# Patient Record
Sex: FEMALE
[De-identification: names, ages, dates, MRNs, and addresses within clinical notes are randomized per-mention and may not be internally consistent; named-entity substitution may affect disease eponyms.]

---

## 2017-01-03 ENCOUNTER — RESULT REVIEW (OUTPATIENT)
Age: 77
End: 2017-01-03

## 2017-01-04 ENCOUNTER — INPATIENT (INPATIENT)
Facility: HOSPITAL | Age: 77
LOS: 0 days | Discharge: HOME | DRG: 39 | End: 2017-01-05
Attending: SURGERY | Admitting: SURGERY
Payer: MEDICARE

## 2017-01-04 VITALS
HEART RATE: 62 BPM | OXYGEN SATURATION: 98 % | TEMPERATURE: 98 F | WEIGHT: 131.4 LBS | DIASTOLIC BLOOD PRESSURE: 60 MMHG | SYSTOLIC BLOOD PRESSURE: 142 MMHG | RESPIRATION RATE: 16 BRPM | HEIGHT: 65 IN

## 2017-01-04 DIAGNOSIS — Z98.891 HISTORY OF UTERINE SCAR FROM PREVIOUS SURGERY: Chronic | ICD-10-CM

## 2017-01-04 DIAGNOSIS — I65.29 OCCLUSION AND STENOSIS OF UNSPECIFIED CAROTID ARTERY: ICD-10-CM

## 2017-01-04 DIAGNOSIS — Z98.890 OTHER SPECIFIED POSTPROCEDURAL STATES: Chronic | ICD-10-CM

## 2017-01-04 LAB — BLD GP AB SCN SERPL QL: SIGNIFICANT CHANGE UP

## 2017-01-04 PROCEDURE — 88311 DECALCIFY TISSUE: CPT | Mod: 26

## 2017-01-04 PROCEDURE — 88304 TISSUE EXAM BY PATHOLOGIST: CPT | Mod: 26

## 2017-01-04 PROCEDURE — 35301 RECHANNELING OF ARTERY: CPT | Mod: RT

## 2017-01-04 RX ORDER — FENTANYL CITRATE 50 UG/ML
25 INJECTION INTRAVENOUS
Qty: 0 | Refills: 0 | Status: DISCONTINUED | OUTPATIENT
Start: 2017-01-04 | End: 2017-01-04

## 2017-01-04 RX ORDER — ZOLPIDEM TARTRATE 10 MG/1
5 TABLET ORAL AT BEDTIME
Qty: 0 | Refills: 0 | Status: DISCONTINUED | OUTPATIENT
Start: 2017-01-04 | End: 2017-01-05

## 2017-01-04 RX ORDER — SODIUM CHLORIDE 9 MG/ML
500 INJECTION INTRAMUSCULAR; INTRAVENOUS; SUBCUTANEOUS ONCE
Qty: 0 | Refills: 0 | Status: COMPLETED | OUTPATIENT
Start: 2017-01-04 | End: 2017-01-04

## 2017-01-04 RX ORDER — PREGABALIN 225 MG/1
1000 CAPSULE ORAL DAILY
Qty: 0 | Refills: 0 | Status: DISCONTINUED | OUTPATIENT
Start: 2017-01-04 | End: 2017-01-05

## 2017-01-04 RX ORDER — SODIUM CHLORIDE 9 MG/ML
1000 INJECTION, SOLUTION INTRAVENOUS
Qty: 0 | Refills: 0 | Status: DISCONTINUED | OUTPATIENT
Start: 2017-01-04 | End: 2017-01-05

## 2017-01-04 RX ORDER — ASCORBIC ACID 60 MG
500 TABLET,CHEWABLE ORAL DAILY
Qty: 0 | Refills: 0 | Status: DISCONTINUED | OUTPATIENT
Start: 2017-01-04 | End: 2017-01-05

## 2017-01-04 RX ORDER — FAMOTIDINE 10 MG/ML
20 INJECTION INTRAVENOUS
Qty: 0 | Refills: 0 | Status: DISCONTINUED | OUTPATIENT
Start: 2017-01-04 | End: 2017-01-05

## 2017-01-04 RX ORDER — ONDANSETRON 8 MG/1
4 TABLET, FILM COATED ORAL ONCE
Qty: 0 | Refills: 0 | Status: DISCONTINUED | OUTPATIENT
Start: 2017-01-04 | End: 2017-01-04

## 2017-01-04 RX ORDER — LEVOTHYROXINE SODIUM 125 MCG
125 TABLET ORAL DAILY
Qty: 0 | Refills: 0 | Status: DISCONTINUED | OUTPATIENT
Start: 2017-01-04 | End: 2017-01-05

## 2017-01-04 RX ORDER — CEFAZOLIN SODIUM 1 G
2000 VIAL (EA) INJECTION ONCE
Qty: 0 | Refills: 0 | Status: COMPLETED | OUTPATIENT
Start: 2017-01-04 | End: 2017-01-04

## 2017-01-04 RX ORDER — SODIUM CHLORIDE 9 MG/ML
1000 INJECTION, SOLUTION INTRAVENOUS
Qty: 0 | Refills: 0 | Status: DISCONTINUED | OUTPATIENT
Start: 2017-01-04 | End: 2017-01-04

## 2017-01-04 RX ORDER — ASPIRIN/CALCIUM CARB/MAGNESIUM 324 MG
325 TABLET ORAL DAILY
Qty: 0 | Refills: 0 | Status: DISCONTINUED | OUTPATIENT
Start: 2017-01-04 | End: 2017-01-05

## 2017-01-04 RX ORDER — ESCITALOPRAM OXALATE 10 MG/1
20 TABLET, FILM COATED ORAL DAILY
Qty: 0 | Refills: 0 | Status: DISCONTINUED | OUTPATIENT
Start: 2017-01-04 | End: 2017-01-05

## 2017-01-04 RX ORDER — SODIUM CHLORIDE 9 MG/ML
500 INJECTION, SOLUTION INTRAVENOUS ONCE
Qty: 0 | Refills: 0 | Status: DISCONTINUED | OUTPATIENT
Start: 2017-01-04 | End: 2017-01-05

## 2017-01-04 RX ORDER — SODIUM CHLORIDE 9 MG/ML
3 INJECTION INTRAMUSCULAR; INTRAVENOUS; SUBCUTANEOUS EVERY 8 HOURS
Qty: 0 | Refills: 0 | Status: DISCONTINUED | OUTPATIENT
Start: 2017-01-04 | End: 2017-01-04

## 2017-01-04 RX ORDER — HEPARIN SODIUM 5000 [USP'U]/ML
800 INJECTION INTRAVENOUS; SUBCUTANEOUS
Qty: 25000 | Refills: 0 | Status: DISCONTINUED | OUTPATIENT
Start: 2017-01-04 | End: 2017-01-05

## 2017-01-04 RX ORDER — FENTANYL CITRATE 50 UG/ML
50 INJECTION INTRAVENOUS
Qty: 0 | Refills: 0 | Status: DISCONTINUED | OUTPATIENT
Start: 2017-01-04 | End: 2017-01-04

## 2017-01-04 RX ORDER — TRAMADOL HYDROCHLORIDE 50 MG/1
50 TABLET ORAL EVERY 4 HOURS
Qty: 0 | Refills: 0 | Status: DISCONTINUED | OUTPATIENT
Start: 2017-01-04 | End: 2017-01-05

## 2017-01-04 RX ORDER — CHOLECALCIFEROL (VITAMIN D3) 125 MCG
2000 CAPSULE ORAL DAILY
Qty: 0 | Refills: 0 | Status: DISCONTINUED | OUTPATIENT
Start: 2017-01-04 | End: 2017-01-05

## 2017-01-04 RX ADMIN — HEPARIN SODIUM 8 UNIT(S)/HR: 5000 INJECTION INTRAVENOUS; SUBCUTANEOUS at 14:16

## 2017-01-04 RX ADMIN — PREGABALIN 1000 MICROGRAM(S): 225 CAPSULE ORAL at 14:14

## 2017-01-04 RX ADMIN — SODIUM CHLORIDE 125 MILLILITER(S): 9 INJECTION, SOLUTION INTRAVENOUS at 11:51

## 2017-01-04 RX ADMIN — SODIUM CHLORIDE 500 MILLILITER(S): 9 INJECTION INTRAMUSCULAR; INTRAVENOUS; SUBCUTANEOUS at 12:20

## 2017-01-04 RX ADMIN — FENTANYL CITRATE 25 MICROGRAM(S): 50 INJECTION INTRAVENOUS at 15:40

## 2017-01-04 RX ADMIN — FENTANYL CITRATE 50 MICROGRAM(S): 50 INJECTION INTRAVENOUS at 17:39

## 2017-01-04 RX ADMIN — FENTANYL CITRATE 25 MICROGRAM(S): 50 INJECTION INTRAVENOUS at 12:05

## 2017-01-04 RX ADMIN — ESCITALOPRAM OXALATE 20 MILLIGRAM(S): 10 TABLET, FILM COATED ORAL at 14:15

## 2017-01-04 RX ADMIN — Medication 100 MILLIGRAM(S): at 08:40

## 2017-01-04 RX ADMIN — FAMOTIDINE 20 MILLIGRAM(S): 10 INJECTION INTRAVENOUS at 19:10

## 2017-01-04 RX ADMIN — TRAMADOL HYDROCHLORIDE 50 MILLIGRAM(S): 50 TABLET ORAL at 23:21

## 2017-01-04 RX ADMIN — FENTANYL CITRATE 25 MICROGRAM(S): 50 INJECTION INTRAVENOUS at 11:45

## 2017-01-04 RX ADMIN — TRAMADOL HYDROCHLORIDE 50 MILLIGRAM(S): 50 TABLET ORAL at 22:19

## 2017-01-04 RX ADMIN — Medication 325 MILLIGRAM(S): at 14:43

## 2017-01-04 RX ADMIN — FENTANYL CITRATE 25 MICROGRAM(S): 50 INJECTION INTRAVENOUS at 15:36

## 2017-01-04 RX ADMIN — FENTANYL CITRATE 50 MICROGRAM(S): 50 INJECTION INTRAVENOUS at 14:17

## 2017-01-04 RX ADMIN — Medication 500 MILLIGRAM(S): at 14:14

## 2017-01-04 RX ADMIN — Medication 2000 UNIT(S): at 14:15

## 2017-01-04 RX ADMIN — TRAMADOL HYDROCHLORIDE 50 MILLIGRAM(S): 50 TABLET ORAL at 15:37

## 2017-01-04 RX ADMIN — ZOLPIDEM TARTRATE 5 MILLIGRAM(S): 10 TABLET ORAL at 22:19

## 2017-01-04 RX ADMIN — FENTANYL CITRATE 25 MICROGRAM(S): 50 INJECTION INTRAVENOUS at 12:23

## 2017-01-04 RX ADMIN — FENTANYL CITRATE 50 MICROGRAM(S): 50 INJECTION INTRAVENOUS at 14:00

## 2017-01-04 NOTE — PROCEDURE NOTE - NSINDICATIONS_GEN_A_CORE
critical patient/arterial puncture to obtain ABG's/monitoring purposes/cannulation purposes/blood sampling

## 2017-01-04 NOTE — PROCEDURE NOTE - NSPROCDETAILS_GEN_ALL_CORE
sutured in place/hemostasis with direct pressure, dressing applied/all materials/supplies accounted for at end of procedure/location identified, draped/prepped, sterile technique used, needle inserted/introduced/connected to a pressurized flush line/Seldinger technique

## 2017-01-04 NOTE — PROCEDURE NOTE - NSSITEPREP_SKIN_A_CORE
Adherence to aseptic technique: hand hygiene prior to donning barriers (gown, gloves), don cap and mask, sterile drape over patient/chlorhexidine/alcohol

## 2017-01-05 ENCOUNTER — TRANSCRIPTION ENCOUNTER (OUTPATIENT)
Age: 77
End: 2017-01-05

## 2017-01-05 VITALS
SYSTOLIC BLOOD PRESSURE: 132 MMHG | DIASTOLIC BLOOD PRESSURE: 53 MMHG | HEART RATE: 55 BPM | OXYGEN SATURATION: 99 % | TEMPERATURE: 98 F | RESPIRATION RATE: 16 BRPM

## 2017-01-05 LAB
ANION GAP SERPL CALC-SCNC: 8 MMOL/L — SIGNIFICANT CHANGE UP (ref 5–17)
APTT BLD: 63.6 SEC — HIGH (ref 27.5–37.4)
BUN SERPL-MCNC: 16 MG/DL — SIGNIFICANT CHANGE UP (ref 8–20)
CALCIUM SERPL-MCNC: 8.9 MG/DL — SIGNIFICANT CHANGE UP (ref 8.6–10.2)
CHLORIDE SERPL-SCNC: 104 MMOL/L — SIGNIFICANT CHANGE UP (ref 98–107)
CO2 SERPL-SCNC: 28 MMOL/L — SIGNIFICANT CHANGE UP (ref 22–29)
CREAT SERPL-MCNC: 0.71 MG/DL — SIGNIFICANT CHANGE UP (ref 0.5–1.3)
GLUCOSE SERPL-MCNC: 114 MG/DL — SIGNIFICANT CHANGE UP (ref 70–115)
HCT VFR BLD CALC: 26.5 % — LOW (ref 37–47)
HGB BLD-MCNC: 8.2 G/DL — LOW (ref 12–16)
INR BLD: 1.02 RATIO — SIGNIFICANT CHANGE UP (ref 0.88–1.16)
MCHC RBC-ENTMCNC: 26.2 PG — LOW (ref 27–31)
MCHC RBC-ENTMCNC: 30.9 G/DL — LOW (ref 32–36)
MCV RBC AUTO: 84.7 FL — SIGNIFICANT CHANGE UP (ref 81–99)
PLATELET # BLD AUTO: 212 K/UL — SIGNIFICANT CHANGE UP (ref 150–400)
POTASSIUM SERPL-MCNC: 5.3 MMOL/L — SIGNIFICANT CHANGE UP (ref 3.5–5.3)
POTASSIUM SERPL-SCNC: 5.3 MMOL/L — SIGNIFICANT CHANGE UP (ref 3.5–5.3)
PROTHROM AB SERPL-ACNC: 11.2 SEC — SIGNIFICANT CHANGE UP (ref 10–13.1)
RBC # BLD: 3.13 M/UL — LOW (ref 4.4–5.2)
RBC # FLD: 15.9 % — HIGH (ref 11–15.6)
SODIUM SERPL-SCNC: 140 MMOL/L — SIGNIFICANT CHANGE UP (ref 135–145)
WBC # BLD: 7.19 K/UL — SIGNIFICANT CHANGE UP (ref 4.8–10.8)
WBC # FLD AUTO: 7.19 K/UL — SIGNIFICANT CHANGE UP (ref 4.8–10.8)

## 2017-01-05 PROCEDURE — 86850 RBC ANTIBODY SCREEN: CPT

## 2017-01-05 PROCEDURE — C1889: CPT

## 2017-01-05 PROCEDURE — 86900 BLOOD TYPING SEROLOGIC ABO: CPT

## 2017-01-05 PROCEDURE — 86901 BLOOD TYPING SEROLOGIC RH(D): CPT

## 2017-01-05 PROCEDURE — 85730 THROMBOPLASTIN TIME PARTIAL: CPT

## 2017-01-05 PROCEDURE — 80048 BASIC METABOLIC PNL TOTAL CA: CPT

## 2017-01-05 PROCEDURE — 88304 TISSUE EXAM BY PATHOLOGIST: CPT

## 2017-01-05 PROCEDURE — 85027 COMPLETE CBC AUTOMATED: CPT

## 2017-01-05 PROCEDURE — 88311 DECALCIFY TISSUE: CPT

## 2017-01-05 PROCEDURE — 85610 PROTHROMBIN TIME: CPT

## 2017-01-05 PROCEDURE — 86920 COMPATIBILITY TEST SPIN: CPT

## 2017-01-05 RX ORDER — TRAMADOL HYDROCHLORIDE 50 MG/1
1 TABLET ORAL
Qty: 30 | Refills: 0 | OUTPATIENT
Start: 2017-01-05

## 2017-01-05 RX ORDER — ACETAMINOPHEN 500 MG
1000 TABLET ORAL ONCE
Qty: 0 | Refills: 0 | Status: COMPLETED | OUTPATIENT
Start: 2017-01-05 | End: 2017-01-05

## 2017-01-05 RX ORDER — ASPIRIN/CALCIUM CARB/MAGNESIUM 324 MG
1 TABLET ORAL
Qty: 0 | Refills: 0 | COMMUNITY
Start: 2017-01-05

## 2017-01-05 RX ADMIN — TRAMADOL HYDROCHLORIDE 50 MILLIGRAM(S): 50 TABLET ORAL at 07:05

## 2017-01-05 RX ADMIN — ESCITALOPRAM OXALATE 20 MILLIGRAM(S): 10 TABLET, FILM COATED ORAL at 17:05

## 2017-01-05 RX ADMIN — TRAMADOL HYDROCHLORIDE 50 MILLIGRAM(S): 50 TABLET ORAL at 14:30

## 2017-01-05 RX ADMIN — FAMOTIDINE 20 MILLIGRAM(S): 10 INJECTION INTRAVENOUS at 17:06

## 2017-01-05 RX ADMIN — TRAMADOL HYDROCHLORIDE 50 MILLIGRAM(S): 50 TABLET ORAL at 15:30

## 2017-01-05 RX ADMIN — TRAMADOL HYDROCHLORIDE 50 MILLIGRAM(S): 50 TABLET ORAL at 06:03

## 2017-01-05 RX ADMIN — TRAMADOL HYDROCHLORIDE 50 MILLIGRAM(S): 50 TABLET ORAL at 11:30

## 2017-01-05 RX ADMIN — Medication 125 MICROGRAM(S): at 06:03

## 2017-01-05 RX ADMIN — FAMOTIDINE 20 MILLIGRAM(S): 10 INJECTION INTRAVENOUS at 06:03

## 2017-01-05 RX ADMIN — Medication 325 MILLIGRAM(S): at 17:04

## 2017-01-05 RX ADMIN — Medication 400 MILLIGRAM(S): at 08:41

## 2017-01-05 RX ADMIN — FENTANYL CITRATE 50 MICROGRAM(S): 50 INJECTION INTRAVENOUS at 18:30

## 2017-01-05 RX ADMIN — TRAMADOL HYDROCHLORIDE 50 MILLIGRAM(S): 50 TABLET ORAL at 10:41

## 2017-01-05 NOTE — DISCHARGE NOTE ADULT - CARE PROVIDER_API CALL
Shilo Ashby), Surgery; Vascular Surgery  [address] [place] [address]  Phone: [phone]  Fax: [phone]    Alexy Ortega), Cardiovascular Disease; Internal Medicine; Interventional Cardiology  [address]  [place] [address]  Phone: [phone]  Fax: [phone]

## 2017-01-05 NOTE — DISCHARGE NOTE ADULT - CARE PLAN
Principal Discharge DX:	Stenosis of right carotid artery  Goal:	stroke risk reduction  Instructions for follow-up, activity and diet:	May shower and wash incision with soap and water. Pat dry and leave open to air. No ointments, powders or creams on/near incision. Tape strips will start to curl and may fall off in next 1-2 weeks. Moniotr for any swelling , redness, or drainage from wound. Call office for any changes. Follow up with Dr Donita Uribe in 1 week. Office will call for appointment.

## 2017-01-05 NOTE — DISCHARGE NOTE ADULT - MEDICATION SUMMARY - MEDICATIONS TO STOP TAKING
I will STOP taking the medications listed below when I get home from the hospital:    metoprolol succinate 25 mg oral tablet, extended release  -- 1 tab(s) by mouth once a day

## 2017-01-05 NOTE — DISCHARGE NOTE ADULT - PLAN OF CARE
stroke risk reduction May shower and wash incision with soap and water. Pat dry and leave open to air. No ointments, powders or creams on/near incision. Tape strips will start to curl and may fall off in next 1-2 weeks. Moniotr for any swelling , redness, or drainage from wound. Call office for any changes. Follow up with Dr Donita Uribe in 1 week. Office will call for appointment.

## 2017-01-05 NOTE — DISCHARGE NOTE ADULT - NS AS ACTIVITY OBS
Walking-Indoors allowed/Bathing allowed/Walking-Outdoors allowed/Stairs allowed/Do not drive or operate machinery/Showering allowed/No Heavy lifting/straining

## 2017-01-05 NOTE — DISCHARGE NOTE ADULT - PATIENT PORTAL LINK FT
“You can access the Follow[facility] Patient Portal, offered by [facility], by registering with the following website: [email or web address]”

## 2017-01-05 NOTE — DISCHARGE NOTE ADULT - NS MD DC FALL RISK RISK
For information on Fall & Injury Prevention, visit [email or web address]

## 2017-01-05 NOTE — DISCHARGE NOTE ADULT - MEDICATION SUMMARY - MEDICATIONS TO TAKE
I will START or STAY ON the medications listed below when I get home from the hospital:    aspirin 325 mg oral tablet  -- 1 tab(s) by mouth once a day  -- Indication: For Carotid stenosis    traMADol 50 mg oral tablet  -- 1 tab(s) by mouth every 4 hours, As Needed -for moderate pain MDD:6 tabs  -- Indication: For Pain    celecoxib 200 mg oral capsule  -- 1 cap(s) by mouth once a day  -- Indication: For Pain    gabapentin 300 mg oral capsule  -- 1 cap(s) by mouth 3 times a day  -- Indication: For Pain    Lexapro 20 mg oral tablet  -- 1 tab(s) by mouth once a day  -- Indication: For Depression    Crestor 5 mg oral tablet  -- 1 tab(s) by mouth once a day (at bedtime)  -- Indication: For HLD    ALPRAZolam 0.5 mg oral tablet  -- 1 tab(s) by mouth 4 times a day, As Needed  -- Indication: For Anxiety and depression    zolpidem 5 mg oral tablet  -- 1 tab(s) by mouth once a day (at bedtime), As Needed  -- Indication: For Insomnia    fluticasone-vilanterol 100 mcg-25 mcg/inh inhalation powder  -- 1 puff(s) inhaled once a day  -- Indication: For COPD    famotidine 20 mg oral tablet  -- 1 tab(s) by mouth 2 times a day  -- Indication: For GI bleed    omeprazole 20 mg oral delayed release tablet  -- 1 tab(s) by mouth once a day  -- Indication: For GI bleed    Synthroid 125 mcg (0.125 mg) oral tablet  -- 1 tab(s) by mouth every other day  -- Indication: For Hypothyroid    Vitamin C 500 mg oral tablet  -- 1 tab(s) by mouth once a day  -- Indication: For Supplement    Vitamin D3 2000 intl units oral capsule  -- 1 cap(s) by mouth once a day  -- Indication: For Supplement    cyanocobalamin 1000 mcg oral tablet  -- 1 tab(s) by mouth once a day  -- Indication: For Supplement

## 2017-01-05 NOTE — DISCHARGE NOTE ADULT - CARE PROVIDERS DIRECT ADDRESSES
[email or web address]

## 2017-01-05 NOTE — PROGRESS NOTE ADULT - SUBJECTIVE AND OBJECTIVE BOX
Procedure: s/p right carotid artery endarterectomy    IV: NS bolus @500cc/hr X 1       LR @ 100cc/hr       heparin drip 8cc/hr  Patient: afebrile awake and alert resting comfortable in stretcher.              denies headache, dizziness or light headedness. denies nausea,vomiting              tolerating PO fluids.  Vital signs:  pulse: 55    resp. 20   O2 sat.- 99 %  BP :   94/56  face: good smile, no lip droop, god tongue range of motion, good voice tone.  Neck: supple, no swelling good range of motion, no bruit auscltated. surgical site             dressing dry and intact. no difficulty swallowing.  DEBRA drain in place scant drainage at present 3cc reddish serosangenous.    Chest: good air exchange, denies SOB, denies pain, or palpatations. clear BS  Abdomen: soft, benign     Extremities: moves Follow all verbal and written instructions. Take medications as prescribed. DO NOT drive, operate machinery, and/or make important decisions while on prescription pain medication. DO NOT hesitate to call Doctor's office with questions or concerns. extremities well, good  bilateral hands, moves fingers and arms well.    bilateral lower extremities warm to palpation , moves toes bilaterally.  Right groin dressing dry and intact removal of arterial line just recently.    neuro check:  grossly intact..     Output: yu catheter in place - 200 cc yellows.    Labs ordered for am.      Impression: stable post op s/p right carotid artery endarterectomy.  Tolerated surgery well, surgery site clean and dry, no swelling or bleeding evident     Plan: continue present care and management and follow, increase diet Dash/tlc as tolerated, repeat labs in am, d/c yu catheter in am.OOB in am. follow DEBRA output.
Called to see patient complaining of right wrist discomfort and "numbness". Patient was immediately seen and examined by me. Patient is s/p Right Carotid endarterectomy, uncomplicated intraoperative course, attempted left radial arterial line during operation but unsuccessful after several attempts in the OR. The hand and wrist were examined. Full motor strength and sensation intact in the hand (tested with alcohol swab, pinprick and hand ). Patient is able to move her hand and wrist through all ranges of motion and radial and ulnar artery pulses are intact. Patient smile and grimace is normal. There is a non-tense hematoma present over the radial artery in the area of "numbness" described by the patient but she responds to gross touch and temperature sensation in that area. This is likely discomfort from the hematoma that is present. I will apply warm compress and have informed Dr. Duckworth.
Patient is a 76y old  Female who presents with a chief complaint of I have a 90%  blockage in my right carotid. (27 Dec 2016 13:35)    Pt is S/P   R CEA       POD#   1  Pt c/o R posterior neck pain radiating to base of skull with headache since this am. Treated with tramadol, IV tylenol and heat pack with improvement. Pt OOB and ambulated to bathroom with min assistance    Vital Signs Last 24 Hrs  T(C): 36.5, Max: 36.8 (- @ 22:00)  T(F): 97.7, Max: 98.3 (- @ 22:00)  HR: 57 (46 - 79)  BP: 116/58 (82/44 - 124/50)  BP(mean): --  RR: 15 (14 - 18)  SpO2: 94% (94% - 100%)  I&O's Detail      PAST MEDICAL & SURGICAL HISTORY:  GI bleed  Anxiety and depression  PVD (peripheral vascular disease): stents LLE  Dyslipidemia  Hypothyroid  Hypertension  S/P ovarian cystectomy: with appendectomy  S/P  section    Physical Exam:  General: Awake, alert. Speech fluent. Face symmetrical. R neck incision with steris intact C&D. DEBRA with sm amt serosang fluid draining-->dc'd  Pulmonary: Nonlabored breathing,CTA  Cardiovascular: Normal S1, S2  Abdominal: soft, NT/ND  Extremities: WWP. L radial with ecchymosis and sm hematoma. Radial pulse 2+    LABS:                        8.2    7.19  )-----------( 212      ( 2017 05:14 )             26.5     2017 05:14    140    |  104    |  16.0   ----------------------------<  114    5.3     |  28.0   |  0.71     Ca    8.9        2017 05:14      PT/INR - ( 2017 05:14 )   PT: 11.2 sec;   INR: 1.02 ratio         PTT - ( 2017 05:14 )  PTT:63.6 sec  CAPILLARY BLOOD GLUCOSE      Radiology and Additional Studies:    Assessment:76yFemaleHPI:  77 yo female with carotid artery stenosis, planning endarterectomy. (27 Dec 2016 13:09)  Pt is S/P   R CEA       POD#   1    Plan:  Cont ASA and Statin  Cont to hold BB with asymptomatic bradycardia  OOB/Ambulate/PT  DC yu-pt already voided  Possible dc home later today  Seen and discussed with Dr. Donita Uribe
Pt seen, chart reviewed.  S/p Right Carotid Endarterectomy, POD#1.  VSS.  Adequate pain control.  Left Arm Edematous and Ecchymotic.  + pulses noted.  Numbness and Tingling resolved.  Resting comfortably.   Tolerating PO intake.  No N/V.    No anesthesia problems noted.

## 2017-01-05 NOTE — DISCHARGE NOTE ADULT - HOSPITAL COURSE
75 y/o F with h/o carotid stenosis followed by Dr Donita Uribe. Admitted on 1/4/17 for elective surgery. Taken to OR on 1/4/17 by Dr Donita Uribe and underwent R CEA. Post op pt with very brief episodes of hypotension requiring perico bolus and fluid bolus. No further hypotension noted after waking and was able to be monitored on 3T. POD #1, pt had c/o neck/headache c/w spasm and relieved with Tramadol and hot packs. Her DEBRA was dc'd and she was OOB ambulating. The pt has had asymptomatic bradycardia and therefore her BB was not resumed. She is stable for dc home with outpt follow up with Dr Donita Uribe.

## 2017-01-05 NOTE — DISCHARGE NOTE ADULT - MEDICATION SUMMARY - MEDICATIONS TO CHANGE
I will SWITCH the dose or number of times a day I take the medications listed below when I get home from the hospital:    Aspirin Enteric Coated 81 mg oral delayed release tablet  -- 1 tab(s) by mouth once a day

## 2017-01-06 LAB — SURGICAL PATHOLOGY FINAL REPORT - CH: SIGNIFICANT CHANGE UP

## 2017-01-13 ENCOUNTER — APPOINTMENT (OUTPATIENT)
Dept: VASCULAR SURGERY | Facility: CLINIC | Age: 77
End: 2017-01-13

## 2017-01-13 VITALS
RESPIRATION RATE: 16 BRPM | HEIGHT: 65 IN | TEMPERATURE: 98.3 F | BODY MASS INDEX: 21.66 KG/M2 | DIASTOLIC BLOOD PRESSURE: 82 MMHG | SYSTOLIC BLOOD PRESSURE: 145 MMHG | HEART RATE: 74 BPM | WEIGHT: 130 LBS | OXYGEN SATURATION: 96 %

## 2017-01-13 RX ORDER — ESCITALOPRAM OXALATE 20 MG/1
20 TABLET ORAL
Qty: 90 | Refills: 0 | Status: ACTIVE | COMMUNITY
Start: 2016-11-02

## 2017-01-13 RX ORDER — ONDANSETRON 4 MG/1
4 TABLET ORAL
Qty: 15 | Refills: 0 | Status: ACTIVE | COMMUNITY
Start: 2016-11-16

## 2017-01-13 RX ORDER — OMEPRAZOLE 20 MG/1
20 CAPSULE, DELAYED RELEASE ORAL
Qty: 30 | Refills: 0 | Status: ACTIVE | COMMUNITY
Start: 2016-12-19

## 2017-03-17 ENCOUNTER — TRANSCRIPTION ENCOUNTER (OUTPATIENT)
Age: 77
End: 2017-03-17

## 2017-03-31 ENCOUNTER — APPOINTMENT (OUTPATIENT)
Dept: VASCULAR SURGERY | Facility: CLINIC | Age: 77
End: 2017-03-31

## 2017-03-31 VITALS
DIASTOLIC BLOOD PRESSURE: 82 MMHG | TEMPERATURE: 98.4 F | HEIGHT: 65 IN | HEART RATE: 60 BPM | SYSTOLIC BLOOD PRESSURE: 150 MMHG | RESPIRATION RATE: 16 BRPM | WEIGHT: 139 LBS | OXYGEN SATURATION: 96 % | BODY MASS INDEX: 23.16 KG/M2

## 2017-03-31 VITALS — SYSTOLIC BLOOD PRESSURE: 167 MMHG | DIASTOLIC BLOOD PRESSURE: 72 MMHG

## 2017-06-27 ENCOUNTER — OUTPATIENT (OUTPATIENT)
Dept: OUTPATIENT SERVICES | Facility: HOSPITAL | Age: 77
LOS: 1 days | Discharge: HOME | End: 2017-06-27
Payer: MEDICARE

## 2017-06-27 VITALS
RESPIRATION RATE: 14 BRPM | HEIGHT: 64 IN | TEMPERATURE: 98 F | HEART RATE: 60 BPM | SYSTOLIC BLOOD PRESSURE: 156 MMHG | WEIGHT: 138.01 LBS | DIASTOLIC BLOOD PRESSURE: 57 MMHG | OXYGEN SATURATION: 100 %

## 2017-06-27 DIAGNOSIS — Z98.890 OTHER SPECIFIED POSTPROCEDURAL STATES: Chronic | ICD-10-CM

## 2017-06-27 DIAGNOSIS — Z98.891 HISTORY OF UTERINE SCAR FROM PREVIOUS SURGERY: Chronic | ICD-10-CM

## 2017-06-27 DIAGNOSIS — M16.11 UNILATERAL PRIMARY OSTEOARTHRITIS, RIGHT HIP: ICD-10-CM

## 2017-06-27 LAB
ANION GAP SERPL CALC-SCNC: 6 MMOL/L — SIGNIFICANT CHANGE UP (ref 5–17)
ANISOCYTOSIS BLD QL: SLIGHT — SIGNIFICANT CHANGE UP
APPEARANCE UR: CLEAR — SIGNIFICANT CHANGE UP
BACTERIA # UR AUTO: (no result)
BASOPHILS # BLD AUTO: 0.1 K/UL — SIGNIFICANT CHANGE UP (ref 0–0.2)
BASOPHILS NFR BLD AUTO: 0.7 % — SIGNIFICANT CHANGE UP (ref 0–2)
BILIRUB UR-MCNC: NEGATIVE — SIGNIFICANT CHANGE UP
BLD GP AB SCN SERPL QL: SIGNIFICANT CHANGE UP
BUN SERPL-MCNC: 16 MG/DL — SIGNIFICANT CHANGE UP (ref 7–23)
CALCIUM SERPL-MCNC: 9.6 MG/DL — SIGNIFICANT CHANGE UP (ref 8.5–10.1)
CHLORIDE SERPL-SCNC: 105 MMOL/L — SIGNIFICANT CHANGE UP (ref 96–108)
CO2 SERPL-SCNC: 30 MMOL/L — SIGNIFICANT CHANGE UP (ref 22–31)
COLOR SPEC: YELLOW — SIGNIFICANT CHANGE UP
COMMENT - URINE: SIGNIFICANT CHANGE UP
CREAT SERPL-MCNC: 0.62 MG/DL — SIGNIFICANT CHANGE UP (ref 0.5–1.3)
DACRYOCYTES BLD QL SMEAR: SLIGHT — SIGNIFICANT CHANGE UP
DIFF PNL FLD: (no result)
ELLIPTOCYTES BLD QL SMEAR: SLIGHT — SIGNIFICANT CHANGE UP
EOSINOPHIL # BLD AUTO: 0.2 K/UL — SIGNIFICANT CHANGE UP (ref 0–0.5)
EOSINOPHIL NFR BLD AUTO: 1.5 % — SIGNIFICANT CHANGE UP (ref 0–6)
EPI CELLS # UR: SIGNIFICANT CHANGE UP
GLUCOSE SERPL-MCNC: 89 MG/DL — SIGNIFICANT CHANGE UP (ref 70–99)
GLUCOSE UR QL: NEGATIVE MG/DL — SIGNIFICANT CHANGE UP
HCT VFR BLD CALC: 42.7 % — SIGNIFICANT CHANGE UP (ref 34.5–45)
HGB BLD-MCNC: 13.8 G/DL — SIGNIFICANT CHANGE UP (ref 11.5–15.5)
HYALINE CASTS # UR AUTO: (no result) /LPF
KETONES UR-MCNC: NEGATIVE — SIGNIFICANT CHANGE UP
LEUKOCYTE ESTERASE UR-ACNC: NEGATIVE — SIGNIFICANT CHANGE UP
LYMPHOCYTES # BLD AUTO: 1 K/UL — SIGNIFICANT CHANGE UP (ref 1–3.3)
LYMPHOCYTES # BLD AUTO: 7.5 % — LOW (ref 13–44)
MACROCYTES BLD QL: SLIGHT — SIGNIFICANT CHANGE UP
MANUAL DIF COMMENT BLD-IMP: SIGNIFICANT CHANGE UP
MCHC RBC-ENTMCNC: 30.1 PG — SIGNIFICANT CHANGE UP (ref 27–34)
MCHC RBC-ENTMCNC: 32.3 GM/DL — SIGNIFICANT CHANGE UP (ref 32–36)
MCV RBC AUTO: 93.1 FL — SIGNIFICANT CHANGE UP (ref 80–100)
MICROCYTES BLD QL: SLIGHT — SIGNIFICANT CHANGE UP
MONOCYTES # BLD AUTO: 0.8 K/UL — SIGNIFICANT CHANGE UP (ref 0–0.9)
MONOCYTES NFR BLD AUTO: 6.3 % — SIGNIFICANT CHANGE UP (ref 2–14)
MRSA PCR RESULT.: SIGNIFICANT CHANGE UP
NEUTROPHILS # BLD AUTO: 11.1 K/UL — HIGH (ref 1.8–7.4)
NEUTROPHILS NFR BLD AUTO: 84 % — HIGH (ref 43–77)
NITRITE UR-MCNC: NEGATIVE — SIGNIFICANT CHANGE UP
PH UR: 6 — SIGNIFICANT CHANGE UP (ref 5–8)
PLAT MORPH BLD: NORMAL — SIGNIFICANT CHANGE UP
PLATELET # BLD AUTO: 267 K/UL — SIGNIFICANT CHANGE UP (ref 150–400)
POIKILOCYTOSIS BLD QL AUTO: SLIGHT — SIGNIFICANT CHANGE UP
POTASSIUM SERPL-MCNC: 4.4 MMOL/L — SIGNIFICANT CHANGE UP (ref 3.5–5.3)
POTASSIUM SERPL-SCNC: 4.4 MMOL/L — SIGNIFICANT CHANGE UP (ref 3.5–5.3)
PROT UR-MCNC: NEGATIVE MG/DL — SIGNIFICANT CHANGE UP
RBC # BLD: 4.58 M/UL — SIGNIFICANT CHANGE UP (ref 3.8–5.2)
RBC # FLD: 20.8 % — HIGH (ref 10.3–14.5)
RBC BLD AUTO: (no result)
RBC CASTS # UR COMP ASSIST: SIGNIFICANT CHANGE UP /HPF (ref 0–4)
S AUREUS DNA NOSE QL NAA+PROBE: SIGNIFICANT CHANGE UP
SODIUM SERPL-SCNC: 141 MMOL/L — SIGNIFICANT CHANGE UP (ref 135–145)
SP GR SPEC: 1.01 — SIGNIFICANT CHANGE UP (ref 1.01–1.02)
TYPE + AB SCN PNL BLD: SIGNIFICANT CHANGE UP
UROBILINOGEN FLD QL: NEGATIVE MG/DL — SIGNIFICANT CHANGE UP
WBC # BLD: 13.2 K/UL — HIGH (ref 3.8–10.5)
WBC # FLD AUTO: 13.2 K/UL — HIGH (ref 3.8–10.5)
WBC UR QL: SIGNIFICANT CHANGE UP

## 2017-06-27 PROCEDURE — 93010 ELECTROCARDIOGRAM REPORT: CPT

## 2017-06-27 PROCEDURE — 71020: CPT | Mod: 26

## 2017-06-27 NOTE — H&P PST ADULT - MUSCULOSKELETAL
details… detailed exam right lower extremity 4/5/diminished strength decreased ROM due to pain/right lower extremity 4/5; decreased ROM right shoulder/diminished strength

## 2017-06-27 NOTE — H&P PST ADULT - PSH
S/P carotid endarterectomy  right; 2017  S/P  section  X2 ,   S/P ovarian cystectomy  with appendectomy; 1966

## 2017-06-27 NOTE — H&P PST ADULT - FAMILY HISTORY
Father  Still living? No  Family history of lung cancer, Age at diagnosis: 61-70     Mother  Still living? No  Maternal family history of emphysema, Age at diagnosis: 51-60     Sibling  Still living? No  Family history of CVA, Age at diagnosis: 41-50

## 2017-06-27 NOTE — H&P PST ADULT - HISTORY OF PRESENT ILLNESS
This is a  76y /o  Female who presents to [facility] prior to proposed procedure with Dr. Collado for right total hip replacement.  Reports followed by  spine for limbar disc disorder and follow up with Pain Management and ordered a right hip x-ray which demonstrated osteoarthritis of right hip. Reports uses cane and walker and Vicodin as directed to help manage pain. Evaluated by Dr. Collado and now presents for said procedure.

## 2017-06-27 NOTE — H&P PST ADULT - PMH
Anemia    Antalgic gait    Anxiety and depression    CAD (coronary artery disease)    COPD (chronic obstructive pulmonary disease)    Dyslipidemia    Gallstones    History of GI bleed  10/4/2016  Hypertension    Hypothyroid    Lumbar disc disorder    Neuropathy    Osteoarthritis of hip  right  Osteoporosis    PVD (peripheral vascular disease)  stents LLE  Sciatic pain, right Anemia    Antalgic gait    Anxiety and depression    CAD (coronary artery disease)    COPD (chronic obstructive pulmonary disease)    Dyslipidemia    Frozen shoulder syndrome  right  Gallstones    History of GI bleed  10/4/2016  Hypertension    Hypothyroid    Lumbar disc disorder    Neuropathy    Osteoarthritis of hip  right  Osteoporosis    PVD (peripheral vascular disease)  stents LLE  Sciatic pain, right

## 2017-06-27 NOTE — H&P PST ADULT - ASSESSMENT
This is a  76y /o  Female who presents to [facility] prior to proposed procedure with Dr. Collado for right total hip replacement.     1. labs: per Dr. Collado.  2. CXR to be reviewed by Radiology.  3. EKG to be reviewed by Cardiology.  4. Clearance with Dr. Campos as scheduled.  5. PT/INR STAT day of procedure.  6. EZ sponges, holistic sheet, pamphlet, and day of procedure instructions provided and reviewed with patient.  7. Smoking cessation pamphlet provided and reviewed with patient.

## 2017-06-29 RX ORDER — SODIUM CHLORIDE 9 MG/ML
3 INJECTION INTRAMUSCULAR; INTRAVENOUS; SUBCUTANEOUS EVERY 8 HOURS
Qty: 0 | Refills: 0 | Status: DISCONTINUED | OUTPATIENT
Start: 2017-06-30 | End: 2017-07-05

## 2017-06-29 RX ORDER — ACETAMINOPHEN 500 MG
650 TABLET ORAL ONCE
Qty: 0 | Refills: 0 | Status: COMPLETED | OUTPATIENT
Start: 2017-06-30 | End: 2017-06-30

## 2017-06-29 RX ORDER — FAMOTIDINE 10 MG/ML
20 INJECTION INTRAVENOUS ONCE
Qty: 0 | Refills: 0 | Status: COMPLETED | OUTPATIENT
Start: 2017-06-30 | End: 2017-06-30

## 2017-06-30 ENCOUNTER — INPATIENT (INPATIENT)
Facility: HOSPITAL | Age: 77
LOS: 4 days | Discharge: NURSING, REHAB OR LONG-TERM CARE | End: 2017-07-05
Attending: ORTHOPAEDIC SURGERY | Admitting: ORTHOPAEDIC SURGERY
Payer: MEDICARE

## 2017-06-30 ENCOUNTER — RESULT REVIEW (OUTPATIENT)
Age: 77
End: 2017-06-30

## 2017-06-30 VITALS
SYSTOLIC BLOOD PRESSURE: 116 MMHG | RESPIRATION RATE: 16 BRPM | WEIGHT: 138.01 LBS | DIASTOLIC BLOOD PRESSURE: 61 MMHG | TEMPERATURE: 99 F | HEART RATE: 65 BPM | HEIGHT: 64 IN

## 2017-06-30 DIAGNOSIS — Z98.891 HISTORY OF UTERINE SCAR FROM PREVIOUS SURGERY: Chronic | ICD-10-CM

## 2017-06-30 DIAGNOSIS — Z98.890 OTHER SPECIFIED POSTPROCEDURAL STATES: Chronic | ICD-10-CM

## 2017-06-30 LAB
ANION GAP SERPL CALC-SCNC: 6 MMOL/L — SIGNIFICANT CHANGE UP (ref 5–17)
BUN SERPL-MCNC: 17 MG/DL — SIGNIFICANT CHANGE UP (ref 7–23)
CALCIUM SERPL-MCNC: 8.1 MG/DL — LOW (ref 8.5–10.1)
CHLORIDE SERPL-SCNC: 111 MMOL/L — HIGH (ref 96–108)
CO2 SERPL-SCNC: 27 MMOL/L — SIGNIFICANT CHANGE UP (ref 22–31)
CREAT SERPL-MCNC: 0.54 MG/DL — SIGNIFICANT CHANGE UP (ref 0.5–1.3)
GLUCOSE SERPL-MCNC: 91 MG/DL — SIGNIFICANT CHANGE UP (ref 70–99)
HCT VFR BLD CALC: 34.4 % — LOW (ref 34.5–45)
HGB BLD-MCNC: 11.7 G/DL — SIGNIFICANT CHANGE UP (ref 11.5–15.5)
INR BLD: 1.05 RATIO — SIGNIFICANT CHANGE UP (ref 0.88–1.16)
MCHC RBC-ENTMCNC: 31.6 PG — SIGNIFICANT CHANGE UP (ref 27–34)
MCHC RBC-ENTMCNC: 34 GM/DL — SIGNIFICANT CHANGE UP (ref 32–36)
MCV RBC AUTO: 92.8 FL — SIGNIFICANT CHANGE UP (ref 80–100)
PLATELET # BLD AUTO: 200 K/UL — SIGNIFICANT CHANGE UP (ref 150–400)
POTASSIUM SERPL-MCNC: 4.5 MMOL/L — SIGNIFICANT CHANGE UP (ref 3.5–5.3)
POTASSIUM SERPL-SCNC: 4.5 MMOL/L — SIGNIFICANT CHANGE UP (ref 3.5–5.3)
PROTHROM AB SERPL-ACNC: 11.3 SEC — SIGNIFICANT CHANGE UP (ref 9.8–12.7)
RBC # BLD: 3.7 M/UL — LOW (ref 3.8–5.2)
RBC # FLD: 19.5 % — HIGH (ref 10.3–14.5)
SODIUM SERPL-SCNC: 144 MMOL/L — SIGNIFICANT CHANGE UP (ref 135–145)
WBC # BLD: 8.5 K/UL — SIGNIFICANT CHANGE UP (ref 3.8–10.5)
WBC # FLD AUTO: 8.5 K/UL — SIGNIFICANT CHANGE UP (ref 3.8–10.5)

## 2017-06-30 PROCEDURE — 73501 X-RAY EXAM HIP UNI 1 VIEW: CPT | Mod: 26

## 2017-06-30 PROCEDURE — 74000: CPT | Mod: 26

## 2017-06-30 PROCEDURE — 88305 TISSUE EXAM BY PATHOLOGIST: CPT | Mod: 26

## 2017-06-30 RX ORDER — FLUTICASONE PROPIONATE 220 MCG
1 AEROSOL WITH ADAPTER (GRAM) INHALATION DAILY
Qty: 0 | Refills: 0 | Status: DISCONTINUED | OUTPATIENT
Start: 2017-06-30 | End: 2017-07-05

## 2017-06-30 RX ORDER — HYDROMORPHONE HYDROCHLORIDE 2 MG/ML
0.5 INJECTION INTRAMUSCULAR; INTRAVENOUS; SUBCUTANEOUS EVERY 4 HOURS
Qty: 0 | Refills: 0 | Status: DISCONTINUED | OUTPATIENT
Start: 2017-06-30 | End: 2017-06-30

## 2017-06-30 RX ORDER — ALPRAZOLAM 0.25 MG
0.5 TABLET ORAL
Qty: 0 | Refills: 0 | Status: DISCONTINUED | OUTPATIENT
Start: 2017-06-30 | End: 2017-07-05

## 2017-06-30 RX ORDER — ALBUTEROL 90 UG/1
1 AEROSOL, METERED ORAL EVERY 6 HOURS
Qty: 0 | Refills: 0 | Status: DISCONTINUED | OUTPATIENT
Start: 2017-06-30 | End: 2017-07-03

## 2017-06-30 RX ORDER — SODIUM CHLORIDE 9 MG/ML
1000 INJECTION INTRAMUSCULAR; INTRAVENOUS; SUBCUTANEOUS
Qty: 0 | Refills: 0 | Status: DISCONTINUED | OUTPATIENT
Start: 2017-06-30 | End: 2017-06-30

## 2017-06-30 RX ORDER — LEVOTHYROXINE SODIUM 125 MCG
1 TABLET ORAL
Qty: 0 | Refills: 0 | COMMUNITY

## 2017-06-30 RX ORDER — OXYCODONE HYDROCHLORIDE 5 MG/1
10 TABLET ORAL EVERY 4 HOURS
Qty: 0 | Refills: 0 | Status: DISCONTINUED | OUTPATIENT
Start: 2017-06-30 | End: 2017-07-05

## 2017-06-30 RX ORDER — ONDANSETRON 8 MG/1
4 TABLET, FILM COATED ORAL ONCE
Qty: 0 | Refills: 0 | Status: DISCONTINUED | OUTPATIENT
Start: 2017-06-30 | End: 2017-06-30

## 2017-06-30 RX ORDER — ASCORBIC ACID 60 MG
500 TABLET,CHEWABLE ORAL
Qty: 0 | Refills: 0 | Status: DISCONTINUED | OUTPATIENT
Start: 2017-06-30 | End: 2017-07-05

## 2017-06-30 RX ORDER — METOPROLOL TARTRATE 50 MG
25 TABLET ORAL DAILY
Qty: 0 | Refills: 0 | Status: DISCONTINUED | OUTPATIENT
Start: 2017-06-30 | End: 2017-07-05

## 2017-06-30 RX ORDER — SENNA PLUS 8.6 MG/1
2 TABLET ORAL AT BEDTIME
Qty: 0 | Refills: 0 | Status: DISCONTINUED | OUTPATIENT
Start: 2017-06-30 | End: 2017-07-05

## 2017-06-30 RX ORDER — ACETAMINOPHEN 500 MG
650 TABLET ORAL EVERY 6 HOURS
Qty: 0 | Refills: 0 | Status: DISCONTINUED | OUTPATIENT
Start: 2017-06-30 | End: 2017-07-05

## 2017-06-30 RX ORDER — ATORVASTATIN CALCIUM 80 MG/1
20 TABLET, FILM COATED ORAL AT BEDTIME
Qty: 0 | Refills: 0 | Status: DISCONTINUED | OUTPATIENT
Start: 2017-06-30 | End: 2017-07-05

## 2017-06-30 RX ORDER — LEVOTHYROXINE SODIUM 125 MCG
112 TABLET ORAL EVERY OTHER DAY
Qty: 0 | Refills: 0 | Status: DISCONTINUED | OUTPATIENT
Start: 2017-06-30 | End: 2017-07-05

## 2017-06-30 RX ORDER — ONDANSETRON 8 MG/1
1 TABLET, FILM COATED ORAL
Qty: 0 | Refills: 0 | COMMUNITY

## 2017-06-30 RX ORDER — ESCITALOPRAM OXALATE 10 MG/1
20 TABLET, FILM COATED ORAL DAILY
Qty: 0 | Refills: 0 | Status: DISCONTINUED | OUTPATIENT
Start: 2017-06-30 | End: 2017-07-05

## 2017-06-30 RX ORDER — LEVOTHYROXINE SODIUM 125 MCG
125 TABLET ORAL EVERY OTHER DAY
Qty: 0 | Refills: 0 | Status: DISCONTINUED | OUTPATIENT
Start: 2017-06-30 | End: 2017-06-30

## 2017-06-30 RX ORDER — FERROUS SULFATE 325(65) MG
325 TABLET ORAL
Qty: 0 | Refills: 0 | Status: DISCONTINUED | OUTPATIENT
Start: 2017-06-30 | End: 2017-07-05

## 2017-06-30 RX ORDER — CEFAZOLIN SODIUM 1 G
2000 VIAL (EA) INJECTION EVERY 8 HOURS
Qty: 0 | Refills: 0 | Status: COMPLETED | OUTPATIENT
Start: 2017-07-01 | End: 2017-07-01

## 2017-06-30 RX ORDER — HYDROMORPHONE HYDROCHLORIDE 2 MG/ML
1 INJECTION INTRAMUSCULAR; INTRAVENOUS; SUBCUTANEOUS EVERY 4 HOURS
Qty: 0 | Refills: 0 | Status: DISCONTINUED | OUTPATIENT
Start: 2017-06-30 | End: 2017-07-05

## 2017-06-30 RX ORDER — ASPIRIN/CALCIUM CARB/MAGNESIUM 324 MG
1 TABLET ORAL
Qty: 0 | Refills: 0 | COMMUNITY

## 2017-06-30 RX ORDER — GABAPENTIN 400 MG/1
100 CAPSULE ORAL EVERY 8 HOURS
Qty: 0 | Refills: 0 | Status: DISCONTINUED | OUTPATIENT
Start: 2017-06-30 | End: 2017-07-05

## 2017-06-30 RX ORDER — DOCUSATE SODIUM 100 MG
100 CAPSULE ORAL EVERY 12 HOURS
Qty: 0 | Refills: 0 | Status: DISCONTINUED | OUTPATIENT
Start: 2017-06-30 | End: 2017-07-05

## 2017-06-30 RX ORDER — DIPHENHYDRAMINE HCL 50 MG
25 CAPSULE ORAL AT BEDTIME
Qty: 0 | Refills: 0 | Status: DISCONTINUED | OUTPATIENT
Start: 2017-06-30 | End: 2017-06-30

## 2017-06-30 RX ORDER — FENTANYL CITRATE 50 UG/ML
25 INJECTION INTRAVENOUS
Qty: 0 | Refills: 0 | Status: DISCONTINUED | OUTPATIENT
Start: 2017-06-30 | End: 2017-06-30

## 2017-06-30 RX ORDER — BENZOCAINE AND MENTHOL 5; 1 G/100ML; G/100ML
1 LIQUID ORAL EVERY 4 HOURS
Qty: 0 | Refills: 0 | Status: DISCONTINUED | OUTPATIENT
Start: 2017-06-30 | End: 2017-07-05

## 2017-06-30 RX ORDER — ROPIVACAINE HCL/PF 5 MG/ML
200 AMPUL (ML) INJECTION
Qty: 0 | Refills: 0 | Status: DISCONTINUED | OUTPATIENT
Start: 2017-06-30 | End: 2017-07-05

## 2017-06-30 RX ORDER — LEVOTHYROXINE SODIUM 125 MCG
125 TABLET ORAL EVERY OTHER DAY
Qty: 0 | Refills: 0 | Status: DISCONTINUED | OUTPATIENT
Start: 2017-06-30 | End: 2017-07-05

## 2017-06-30 RX ORDER — OXYCODONE HYDROCHLORIDE 5 MG/1
5 TABLET ORAL EVERY 4 HOURS
Qty: 0 | Refills: 0 | Status: DISCONTINUED | OUTPATIENT
Start: 2017-06-30 | End: 2017-07-05

## 2017-06-30 RX ORDER — ASPIRIN/CALCIUM CARB/MAGNESIUM 324 MG
81 TABLET ORAL DAILY
Qty: 0 | Refills: 0 | Status: DISCONTINUED | OUTPATIENT
Start: 2017-06-30 | End: 2017-07-01

## 2017-06-30 RX ORDER — PREGABALIN 225 MG/1
1000 CAPSULE ORAL DAILY
Qty: 0 | Refills: 0 | Status: DISCONTINUED | OUTPATIENT
Start: 2017-06-30 | End: 2017-07-05

## 2017-06-30 RX ORDER — FOLIC ACID 0.8 MG
1 TABLET ORAL DAILY
Qty: 0 | Refills: 0 | Status: DISCONTINUED | OUTPATIENT
Start: 2017-06-30 | End: 2017-07-05

## 2017-06-30 RX ORDER — METOPROLOL TARTRATE 50 MG
1 TABLET ORAL
Qty: 0 | Refills: 0 | COMMUNITY

## 2017-06-30 RX ORDER — DOCUSATE SODIUM 100 MG
100 CAPSULE ORAL THREE TIMES A DAY
Qty: 0 | Refills: 0 | Status: DISCONTINUED | OUTPATIENT
Start: 2017-06-30 | End: 2017-06-30

## 2017-06-30 RX ORDER — ACETAMINOPHEN 500 MG
650 TABLET ORAL EVERY 6 HOURS
Qty: 0 | Refills: 0 | Status: COMPLETED | OUTPATIENT
Start: 2017-06-30 | End: 2017-07-04

## 2017-06-30 RX ORDER — POLYETHYLENE GLYCOL 3350 17 G/17G
17 POWDER, FOR SOLUTION ORAL DAILY
Qty: 0 | Refills: 0 | Status: DISCONTINUED | OUTPATIENT
Start: 2017-06-30 | End: 2017-07-05

## 2017-06-30 RX ORDER — DIPHENHYDRAMINE HCL 50 MG
25 CAPSULE ORAL AT BEDTIME
Qty: 0 | Refills: 0 | Status: DISCONTINUED | OUTPATIENT
Start: 2017-06-30 | End: 2017-07-05

## 2017-06-30 RX ORDER — ALBUTEROL 90 UG/1
3 AEROSOL, METERED ORAL
Qty: 0 | Refills: 0 | COMMUNITY

## 2017-06-30 RX ORDER — PANTOPRAZOLE SODIUM 20 MG/1
40 TABLET, DELAYED RELEASE ORAL DAILY
Qty: 0 | Refills: 0 | Status: DISCONTINUED | OUTPATIENT
Start: 2017-06-30 | End: 2017-07-05

## 2017-06-30 RX ORDER — SODIUM CHLORIDE 9 MG/ML
1000 INJECTION, SOLUTION INTRAVENOUS
Qty: 0 | Refills: 0 | Status: DISCONTINUED | OUTPATIENT
Start: 2017-06-30 | End: 2017-07-05

## 2017-06-30 RX ORDER — ONDANSETRON 8 MG/1
4 TABLET, FILM COATED ORAL EVERY 6 HOURS
Qty: 0 | Refills: 0 | Status: DISCONTINUED | OUTPATIENT
Start: 2017-06-30 | End: 2017-07-05

## 2017-06-30 RX ORDER — SENNA PLUS 8.6 MG/1
2 TABLET ORAL AT BEDTIME
Qty: 0 | Refills: 0 | Status: DISCONTINUED | OUTPATIENT
Start: 2017-06-30 | End: 2017-06-30

## 2017-06-30 RX ORDER — ASPIRIN/CALCIUM CARB/MAGNESIUM 324 MG
325 TABLET ORAL
Qty: 0 | Refills: 0 | Status: DISCONTINUED | OUTPATIENT
Start: 2017-06-30 | End: 2017-07-01

## 2017-06-30 RX ORDER — FAMOTIDINE 10 MG/ML
1 INJECTION INTRAVENOUS
Qty: 0 | Refills: 0 | COMMUNITY

## 2017-06-30 RX ORDER — ONDANSETRON 8 MG/1
4 TABLET, FILM COATED ORAL EVERY 6 HOURS
Qty: 0 | Refills: 0 | Status: DISCONTINUED | OUTPATIENT
Start: 2017-06-30 | End: 2017-06-30

## 2017-06-30 RX ORDER — KETOROLAC TROMETHAMINE 30 MG/ML
15 SYRINGE (ML) INJECTION EVERY 6 HOURS
Qty: 0 | Refills: 0 | Status: DISCONTINUED | OUTPATIENT
Start: 2017-06-30 | End: 2017-07-02

## 2017-06-30 RX ADMIN — ATORVASTATIN CALCIUM 20 MILLIGRAM(S): 80 TABLET, FILM COATED ORAL at 22:09

## 2017-06-30 RX ADMIN — Medication 200 MILLILITER(S): at 20:42

## 2017-06-30 RX ADMIN — FAMOTIDINE 20 MILLIGRAM(S): 10 INJECTION INTRAVENOUS at 16:27

## 2017-06-30 RX ADMIN — OXYCODONE HYDROCHLORIDE 10 MILLIGRAM(S): 5 TABLET ORAL at 22:36

## 2017-06-30 RX ADMIN — SODIUM CHLORIDE 3 MILLILITER(S): 9 INJECTION INTRAMUSCULAR; INTRAVENOUS; SUBCUTANEOUS at 21:55

## 2017-06-30 RX ADMIN — FENTANYL CITRATE 25 MICROGRAM(S): 50 INJECTION INTRAVENOUS at 20:35

## 2017-06-30 RX ADMIN — Medication 650 MILLIGRAM(S): at 16:27

## 2017-06-30 RX ADMIN — GABAPENTIN 100 MILLIGRAM(S): 400 CAPSULE ORAL at 22:09

## 2017-06-30 RX ADMIN — Medication 650 MILLIGRAM(S): at 22:10

## 2017-06-30 RX ADMIN — HYDROMORPHONE HYDROCHLORIDE 0.5 MILLIGRAM(S): 2 INJECTION INTRAMUSCULAR; INTRAVENOUS; SUBCUTANEOUS at 21:58

## 2017-06-30 RX ADMIN — Medication 15 MILLIGRAM(S): at 23:01

## 2017-06-30 RX ADMIN — HYDROMORPHONE HYDROCHLORIDE 1 MILLIGRAM(S): 2 INJECTION INTRAMUSCULAR; INTRAVENOUS; SUBCUTANEOUS at 23:15

## 2017-06-30 RX ADMIN — Medication 100 MILLIGRAM(S): at 22:17

## 2017-06-30 RX ADMIN — SODIUM CHLORIDE 75 MILLILITER(S): 9 INJECTION INTRAMUSCULAR; INTRAVENOUS; SUBCUTANEOUS at 20:01

## 2017-06-30 NOTE — PROGRESS NOTE ADULT - SUBJECTIVE AND OBJECTIVE BOX
Pt seen & examined. Pain controlled.  All vital signs stable  Gen: NAD  RLE:  Dressing clean D&I  +sensation L2-S1  +dorsiflexion/plantarflexion of ankle/hallux  +dorsalis pedis pulse  Soft compartments, - calf tenderness

## 2017-06-30 NOTE — PATIENT PROFILE ADULT. - PMH
Anemia    Antalgic gait    Anxiety and depression    CAD (coronary artery disease)    COPD (chronic obstructive pulmonary disease)    Dyslipidemia    Frozen shoulder syndrome  right  Gallstones    History of GI bleed  10/4/2016  Hypertension    Hypothyroid    Lumbar disc disorder    Neuropathy    Osteoarthritis of hip  right  Osteoporosis    PVD (peripheral vascular disease)  stents LLE  Sciatic pain, right

## 2017-06-30 NOTE — PROGRESS NOTE ADULT - SUBJECTIVE AND OBJECTIVE BOX
___Right____ Psoas Catheter Note:  Time out performed, pt awake and alert, pt sitting, sterile prep with povodone iodine, + local with 3cc 2% lidocaine, 17G plexus block needle inserted approximately 4 cm _Right___ lateral to midline at approximately L4 level, transverse process contacted, needle stepped off cephalad and advanced, + loss of resistance, 19G stimucath advanced to 15cm, no paresthesia, no heme, catheter secured with tegaderm. Procedure well tolerated without complications.  Will confirm position with Xray.

## 2017-07-01 LAB
ANION GAP SERPL CALC-SCNC: 6 MMOL/L — SIGNIFICANT CHANGE UP (ref 5–17)
BUN SERPL-MCNC: 14 MG/DL — SIGNIFICANT CHANGE UP (ref 7–23)
CALCIUM SERPL-MCNC: 8.2 MG/DL — LOW (ref 8.5–10.1)
CHLORIDE SERPL-SCNC: 109 MMOL/L — HIGH (ref 96–108)
CO2 SERPL-SCNC: 25 MMOL/L — SIGNIFICANT CHANGE UP (ref 22–31)
CREAT SERPL-MCNC: 0.45 MG/DL — LOW (ref 0.5–1.3)
GLUCOSE SERPL-MCNC: 100 MG/DL — HIGH (ref 70–99)
HCT VFR BLD CALC: 33.3 % — LOW (ref 34.5–45)
HGB BLD-MCNC: 10.6 G/DL — LOW (ref 11.5–15.5)
INR BLD: 1.05 RATIO — SIGNIFICANT CHANGE UP (ref 0.88–1.16)
MCHC RBC-ENTMCNC: 30 PG — SIGNIFICANT CHANGE UP (ref 27–34)
MCHC RBC-ENTMCNC: 31.9 GM/DL — LOW (ref 32–36)
MCV RBC AUTO: 93.8 FL — SIGNIFICANT CHANGE UP (ref 80–100)
PLATELET # BLD AUTO: 193 K/UL — SIGNIFICANT CHANGE UP (ref 150–400)
POTASSIUM SERPL-MCNC: 3.9 MMOL/L — SIGNIFICANT CHANGE UP (ref 3.5–5.3)
POTASSIUM SERPL-SCNC: 3.9 MMOL/L — SIGNIFICANT CHANGE UP (ref 3.5–5.3)
PROTHROM AB SERPL-ACNC: 11.4 SEC — SIGNIFICANT CHANGE UP (ref 9.8–12.7)
RBC # BLD: 3.55 M/UL — LOW (ref 3.8–5.2)
RBC # FLD: 19.7 % — HIGH (ref 10.3–14.5)
SODIUM SERPL-SCNC: 140 MMOL/L — SIGNIFICANT CHANGE UP (ref 135–145)
WBC # BLD: 10.8 K/UL — HIGH (ref 3.8–10.5)
WBC # FLD AUTO: 10.8 K/UL — HIGH (ref 3.8–10.5)

## 2017-07-01 PROCEDURE — 99223 1ST HOSP IP/OBS HIGH 75: CPT

## 2017-07-01 RX ORDER — ENOXAPARIN SODIUM 100 MG/ML
40 INJECTION SUBCUTANEOUS DAILY
Qty: 0 | Refills: 0 | Status: DISCONTINUED | OUTPATIENT
Start: 2017-07-01 | End: 2017-07-05

## 2017-07-01 RX ORDER — TRAMADOL HYDROCHLORIDE 50 MG/1
1 TABLET ORAL
Qty: 0 | Refills: 0 | COMMUNITY

## 2017-07-01 RX ADMIN — ATORVASTATIN CALCIUM 20 MILLIGRAM(S): 80 TABLET, FILM COATED ORAL at 21:29

## 2017-07-01 RX ADMIN — Medication 100 MILLIGRAM(S): at 05:57

## 2017-07-01 RX ADMIN — SODIUM CHLORIDE 50 MILLILITER(S): 9 INJECTION, SOLUTION INTRAVENOUS at 03:56

## 2017-07-01 RX ADMIN — Medication 100 MILLIGRAM(S): at 17:45

## 2017-07-01 RX ADMIN — Medication 650 MILLIGRAM(S): at 11:45

## 2017-07-01 RX ADMIN — Medication 500 MILLIGRAM(S): at 17:46

## 2017-07-01 RX ADMIN — SENNA PLUS 2 TABLET(S): 8.6 TABLET ORAL at 21:29

## 2017-07-01 RX ADMIN — Medication 325 MILLIGRAM(S): at 07:49

## 2017-07-01 RX ADMIN — Medication 1 MILLIGRAM(S): at 11:46

## 2017-07-01 RX ADMIN — PANTOPRAZOLE SODIUM 40 MILLIGRAM(S): 20 TABLET, DELAYED RELEASE ORAL at 11:53

## 2017-07-01 RX ADMIN — Medication 100 MILLIGRAM(S): at 03:54

## 2017-07-01 RX ADMIN — HYDROMORPHONE HYDROCHLORIDE 1 MILLIGRAM(S): 2 INJECTION INTRAMUSCULAR; INTRAVENOUS; SUBCUTANEOUS at 23:34

## 2017-07-01 RX ADMIN — GABAPENTIN 100 MILLIGRAM(S): 400 CAPSULE ORAL at 21:28

## 2017-07-01 RX ADMIN — Medication 125 MICROGRAM(S): at 11:50

## 2017-07-01 RX ADMIN — OXYCODONE HYDROCHLORIDE 10 MILLIGRAM(S): 5 TABLET ORAL at 21:27

## 2017-07-01 RX ADMIN — Medication 650 MILLIGRAM(S): at 19:56

## 2017-07-01 RX ADMIN — OXYCODONE HYDROCHLORIDE 10 MILLIGRAM(S): 5 TABLET ORAL at 19:53

## 2017-07-01 RX ADMIN — SODIUM CHLORIDE 3 MILLILITER(S): 9 INJECTION INTRAMUSCULAR; INTRAVENOUS; SUBCUTANEOUS at 21:26

## 2017-07-01 RX ADMIN — Medication 15 MILLIGRAM(S): at 06:43

## 2017-07-01 RX ADMIN — POLYETHYLENE GLYCOL 3350 17 GRAM(S): 17 POWDER, FOR SOLUTION ORAL at 11:50

## 2017-07-01 RX ADMIN — PREGABALIN 1000 MICROGRAM(S): 225 CAPSULE ORAL at 13:16

## 2017-07-01 RX ADMIN — Medication 650 MILLIGRAM(S): at 23:34

## 2017-07-01 RX ADMIN — Medication 100 MILLIGRAM(S): at 17:46

## 2017-07-01 RX ADMIN — Medication 15 MILLIGRAM(S): at 07:43

## 2017-07-01 RX ADMIN — HYDROMORPHONE HYDROCHLORIDE 1 MILLIGRAM(S): 2 INJECTION INTRAMUSCULAR; INTRAVENOUS; SUBCUTANEOUS at 10:13

## 2017-07-01 RX ADMIN — GABAPENTIN 100 MILLIGRAM(S): 400 CAPSULE ORAL at 05:57

## 2017-07-01 RX ADMIN — SODIUM CHLORIDE 3 MILLILITER(S): 9 INJECTION INTRAMUSCULAR; INTRAVENOUS; SUBCUTANEOUS at 05:02

## 2017-07-01 RX ADMIN — HYDROMORPHONE HYDROCHLORIDE 1 MILLIGRAM(S): 2 INJECTION INTRAMUSCULAR; INTRAVENOUS; SUBCUTANEOUS at 14:08

## 2017-07-01 RX ADMIN — SODIUM CHLORIDE 3 MILLILITER(S): 9 INJECTION INTRAMUSCULAR; INTRAVENOUS; SUBCUTANEOUS at 13:18

## 2017-07-01 RX ADMIN — Medication 650 MILLIGRAM(S): at 17:45

## 2017-07-01 RX ADMIN — Medication 650 MILLIGRAM(S): at 05:57

## 2017-07-01 RX ADMIN — Medication 650 MILLIGRAM(S): at 12:20

## 2017-07-01 RX ADMIN — Medication 1 TABLET(S): at 11:45

## 2017-07-01 RX ADMIN — Medication 325 MILLIGRAM(S): at 06:51

## 2017-07-01 RX ADMIN — GABAPENTIN 100 MILLIGRAM(S): 400 CAPSULE ORAL at 13:18

## 2017-07-01 RX ADMIN — ESCITALOPRAM OXALATE 20 MILLIGRAM(S): 10 TABLET, FILM COATED ORAL at 11:46

## 2017-07-01 RX ADMIN — Medication 325 MILLIGRAM(S): at 17:45

## 2017-07-01 RX ADMIN — OXYCODONE HYDROCHLORIDE 10 MILLIGRAM(S): 5 TABLET ORAL at 05:57

## 2017-07-01 RX ADMIN — Medication 100 MILLIGRAM(S): at 10:14

## 2017-07-01 RX ADMIN — Medication 325 MILLIGRAM(S): at 11:45

## 2017-07-01 RX ADMIN — Medication 112 MICROGRAM(S): at 11:50

## 2017-07-01 RX ADMIN — HYDROMORPHONE HYDROCHLORIDE 1 MILLIGRAM(S): 2 INJECTION INTRAMUSCULAR; INTRAVENOUS; SUBCUTANEOUS at 03:44

## 2017-07-01 RX ADMIN — ENOXAPARIN SODIUM 40 MILLIGRAM(S): 100 INJECTION SUBCUTANEOUS at 11:47

## 2017-07-01 RX ADMIN — OXYCODONE HYDROCHLORIDE 10 MILLIGRAM(S): 5 TABLET ORAL at 13:20

## 2017-07-01 NOTE — PHYSICAL THERAPY INITIAL EVALUATION ADULT - PERTINENT HX OF CURRENT PROBLEM, REHAB EVAL
pt received supine in bed on 2N. pt cooperative with PT.  no c/o RLE pain at rest, pt with some c/o back and R shldr pain, states she has a R frozen shoulder.

## 2017-07-01 NOTE — PHYSICAL THERAPY INITIAL EVALUATION ADULT - RANGE OF MOTION EXAMINATION, REHAB EVAL
except R hip flexion=~85 degrees, R hip abd=25 degrees, R shldr ROM limited hx of frozen shoulder, PROM shldr flexion=~60 degrees/no ROM deficits were identified

## 2017-07-01 NOTE — PHYSICAL THERAPY INITIAL EVALUATION ADULT - GAIT DEVIATIONS NOTED, PT EVAL
increased time in double stance/decreased libertad/decreased step length/decreased weight-shifting ability

## 2017-07-01 NOTE — PHYSICAL THERAPY INITIAL EVALUATION ADULT - CRITERIA FOR SKILLED THERAPEUTIC INTERVENTIONS
anticipated discharge recommendation/rehab potential/anticipated equipment needs at discharge/impairments found

## 2017-07-01 NOTE — CONSULT NOTE ADULT - SUBJECTIVE AND OBJECTIVE BOX
HPI:  Patient is a 76y old  Female who presents with a chief complaint of right hip pain, is now s/p right total hip replacement on  with Dr. Collado. Past medical history significant for CEA 2016 complicated by extensive GI bleed (10/2016) prior due to 4 days of Effient therapy as pre-operative prophylaxis. GI bleeding required 4 day ICU stay, per patient, multiple transfusions. Per patient, upper endo and colonoscopy performed, no cauterization occurred- denies any other surgical intervention. Is seen by gastro Dr. Tan in [facility] and has not had any recurrence of bleed, but is being monitored and still receiving Iron infusions for anemia.   PMH also significant for COPD, CAD, spinal stenosis, and PVD with stents.      Consulted by Dr. Collado for VTE prophylaxis, risk stratification, and anticoagulation management.    PAST MEDICAL & SURGICAL HISTORY:  Frozen shoulder syndrome: right  Antalgic gait  CAD (coronary artery disease)  Anemia  Gallstones  Sciatic pain, right  Lumbar disc disorder  Osteoporosis  COPD (chronic obstructive pulmonary disease)  Neuropathy  Osteoarthritis of hip: right  History of GI bleed: 10/4/2016  Anxiety and depression  PVD (peripheral vascular disease): stents LLE  Dyslipidemia  Hypothyroid  Hypertension  S/P carotid endarterectomy: right; 2017  S/P ovarian cystectomy: with appendectomy;   S/P  section: X2 ,       BMI: 23.7    CrCl: 91.8    Caprini VTE Risk Score: 9 (high)    IMPROVE Bleeding Risk Score: 1.5 (low)     Falls Risk:   High ( x )  Mod (  )  Low (  )    17: Patient seen at bedside. See HPI. Discussed medical history of GI bleed 7 months prior. Spoke with Dr. Canales as well. Patient high thrombosis risk due to surgery, COPD, and age. However due to GI bleed induced by anticoagulant- will choose safest option for VTE. Discussed Lovenox Sq once daily. Dr. Canales in agreement with therapy as it it least likely to cause bleeding, but proven effective post THR VTE prophylaxis.    FAMILY HISTORY:  Family history of CVA (Sibling): brother; s/p fall;  age 69.  Maternal family history of emphysema (Mother)  Family history of lung cancer (Father): father    Denies any personal or familial history of clotting or bleeding disorders.    Allergies    Cipro (Hives)  clindamycin (Hives)  Flagyl (Hives)  oxycodone (Hives)  Plavix (Hives)  sulfa drugs (Hives)  vancomycin (Hives)    Intolerances    Effient (Other)      REVIEW OF SYSTEMS    (  )Fever	     (  )Constipation	(  )SOB				(  )Headache	(  )Dysuria  (  )Chills	     (  )Melena	(  )Dyspnea present on exertion	                    (  )Dizziness                    (  )Polyuria  (  )Nausea	     (  )Hematochezia	(  )Cough			                    (  )Syncope   	(  )Hematuria  (  )Vomiting    (  )Chest Pain	(  )Wheezing			(  )Weakness  (  )Diarrhea     (  )Palpitations	(  )Anorexia			(  )Myalgia    All other review of systems negative: Yes      PHYSICAL EXAM:    Constitutional: Appears Well    Neurological: A& O x 3    Skin: Warm    Respiratory and Thorax: normal effort; Breath sounds: normal; No rales/wheezing/rhonchi  	  Cardiovascular: S1, S2, regular, NMBR	    Gastrointestinal: BS + x 4Q, nontender	    Genitourinary:  Bladder nondistended, nontender    Musculoskeletal:   General Right:   +muscle/joint tenderness,   normal tone, no joint swelling,   ROM: limited	    General Left:   no muscle/joint tenderness,   normal tone, no joint swelling,   ROM: full    Hip:  Right: Dressing CDI- aquacels x 2      Lower extrems:   Right: no calf tenderness              negative arturo's sign               + pedal pulses    Left:   no calf tenderness              negative arturo's sign               + pedal pulses                          10.6   10.8  )-----------( 193      ( 2017 07:27 )             33.3       07-01    140  |  109<H>  |  14  ----------------------------<  100<H>  3.9   |  25  |  0.45<L>    Ca    8.2<L>      2017 07:27        PT/INR - ( 2017 08:58 )   PT: 11.4 sec;   INR: 1.05 ratio         				    MEDICATIONS  (STANDING):  sodium chloride 0.9% lock flush 3 milliLiter(s) IV Push every 8 hours  lactated ringers. 1000 milliLiter(s) IV Continuous <Continuous>  pantoprazole    Tablet 40 milliGRAM(s) Oral daily  polyethylene glycol 3350 17 Gram(s) Oral daily  ferrous    sulfate 325 milliGRAM(s) Oral three times a day with meals  folic acid 1 milliGRAM(s) Oral daily  multivitamin 1 Tablet(s) Oral daily  ascorbic acid 500 milliGRAM(s) Oral two times a day  pregabalin 100 milliGRAM(s) Oral two times a day  escitalopram 20 milliGRAM(s) Oral daily  atorvastatin 20 milliGRAM(s) Oral at bedtime  metoprolol succinate ER 25 milliGRAM(s) Oral daily  fluticasone propionate   110 MICROgram(s) HFA Inhaler 1 Puff(s) Inhalation daily  levothyroxine 112 MICROGram(s) Oral every other day  cyanocobalamin 1000 MICROGram(s) Oral daily  ropivacaine 0.2% in 0.9% Sodium Chloride PCRA 200 milliLiter(s) Regional Catheter PCA Continuous  acetaminophen   Tablet. 650 milliGRAM(s) Oral every 6 hours  gabapentin 100 milliGRAM(s) Oral every 8 hours  docusate sodium 100 milliGRAM(s) Oral every 12 hours  senna 2 Tablet(s) Oral at bedtime  levothyroxine 125 MICROGram(s) Oral every other day  enoxaparin Injectable 40 milliGRAM(s) SubCutaneous daily      Vital Signs Last 24 Hrs  T(C): 37.2 (2017 07:25), Max: 37.4 (2017 15:19)  T(F): 99 (2017 07:25), Max: 99.3 (2017 15:19)  HR: 85 (2017 07:25) (64 - 85)  BP: 113/62 (2017 07:25) (98/51 - 152/63)  BP(mean): --  RR: 17 (2017 07:25) (16 - 18)  SpO2: 97% (2017 07:25) (96% - 100%)    DVT Prophylaxis:  LMWH                   ( x )  Heparin SQ           (  )  Coumadin             (  )  Xarelto                  (  )  Eliquis                   (  )  Venodynes           (x  )  Ambulation          (x  )  UFH                       (  )  Contraindicated  (  )

## 2017-07-01 NOTE — CONSULT NOTE ADULT - SUBJECTIVE AND OBJECTIVE BOX
Patient is a 76y old  Female who presents with a chief complaint of " I had my surgery"      HPI: Pt is a 75 y/o female with h/o anemia, CAD, osteoporosis, COPD, neuropathy, chronic LBP, PVD s/p LLE stent, hyperlipidemia, hypothyroidism, osteoarthritis who has been having increasing right hip pain which has affected her ADLS and quality of life.  She was having difficulty going up stairs, getting dressed therefore she was admitted for elective Rt total hip arthroplasty.  Post-op medicine consult called for comanagement.  Pt seen and examined, c/o chronic LBP and Rt hip pain.  No CP or SOB.       PAST MEDICAL & SURGICAL HISTORY:  Frozen shoulder syndrome: right  Antalgic gait  CAD (coronary artery disease)  Anemia  Gallstones  Sciatic pain, right  Lumbar disc disorder  Osteoporosis  COPD (chronic obstructive pulmonary disease)  Neuropathy  Osteoarthritis of hip: right  History of GI bleed: 10/4/2016  Anxiety and depression  PVD (peripheral vascular disease): stents LLE  Dyslipidemia  Hypothyroid  Hypertension  S/P carotid endarterectomy: right; 2017  S/P ovarian cystectomy: with appendectomy;   S/P  section: X2 ,       Allergies    Cipro (Hives)  clindamycin (Hives)  Flagyl (Hives)  oxycodone (Hives)  Plavix (Hives)  sulfa drugs (Hives)  vancomycin (Hives)    Intolerances    Effient (Other)      MEDICATIONS  (STANDING):  sodium chloride 0.9% lock flush 3 milliLiter(s) IV Push every 8 hours  lactated ringers. 1000 milliLiter(s) (75 mL/Hr) IV Continuous <Continuous>  aspirin enteric coated 325 milliGRAM(s) Oral two times a day  ceFAZolin   IVPB 2000 milliGRAM(s) IV Intermittent every 8 hours  pantoprazole    Tablet 40 milliGRAM(s) Oral daily  polyethylene glycol 3350 17 Gram(s) Oral daily  ferrous    sulfate 325 milliGRAM(s) Oral three times a day with meals  folic acid 1 milliGRAM(s) Oral daily  multivitamin 1 Tablet(s) Oral daily  ascorbic acid 500 milliGRAM(s) Oral two times a day  pregabalin 100 milliGRAM(s) Oral two times a day  escitalopram 20 milliGRAM(s) Oral daily  atorvastatin 20 milliGRAM(s) Oral at bedtime  metoprolol succinate ER 25 milliGRAM(s) Oral daily  fluticasone propionate   110 MICROgram(s) HFA Inhaler 1 Puff(s) Inhalation daily  levothyroxine 112 MICROGram(s) Oral every other day  cyanocobalamin 1000 MICROGram(s) Oral daily  ropivacaine 0.2% in 0.9% Sodium Chloride PCRA 200 milliLiter(s) Regional Catheter PCA Continuous  acetaminophen   Tablet. 650 milliGRAM(s) Oral every 6 hours  gabapentin 100 milliGRAM(s) Oral every 8 hours  docusate sodium 100 milliGRAM(s) Oral every 12 hours  senna 2 Tablet(s) Oral at bedtime  levothyroxine 125 MICROGram(s) Oral every other day    MEDICATIONS  (PRN):  acetaminophen   Tablet 650 milliGRAM(s) Oral every 6 hours PRN For Temp over 38.3 C (100.94 F)  acetaminophen   Tablet 650 milliGRAM(s) Oral every 6 hours PRN headache  oxyCODONE IR 5 milliGRAM(s) Oral every 4 hours PRN Mild Pain  oxyCODONE IR 10 milliGRAM(s) Oral every 4 hours PRN Moderate Pain  aluminum hydroxide/magnesium hydroxide/simethicone Suspension 30 milliLiter(s) Oral four times a day PRN Indigestion  ondansetron Injectable 4 milliGRAM(s) IV Push every 6 hours PRN Nausea and/or Vomiting  benzocaine 15 mG/menthol 3.6 mG Lozenge 1 Lozenge Oral every 4 hours PRN Sore Throat  diphenhydrAMINE   Capsule 25 milliGRAM(s) Oral at bedtime PRN Insomnia  ALPRAZolam 0.5 milliGRAM(s) Oral four times a day PRN anxiety  ALBUTerol    90 MICROgram(s) HFA Inhaler 1 Puff(s) Inhalation every 6 hours PRN Shortness of Breath and/or Wheezing  ketorolac   Injectable 15 milliGRAM(s) IV Push every 6 hours PRN breakthrough  HYDROmorphone  Injectable 1 milliGRAM(s) IV Push every 4 hours PRN Severe Pain (7 - 10)      FAMILY HISTORY:  Family history of CVA (Sibling): brother; s/p fall;  age 69.  Maternal family history of emphysema (Mother)  Family history of lung cancer (Father): father      Social History: retired, lives alone, denies ETOH use, current everyday smoker (smokes 1/2 ppd for 40 years)      Vital Signs Last 24 Hrs  T(C): 37.1 (2017 03:58), Max: 37.4 (2017 15:19)  T(F): 98.7 (2017 03:58), Max: 99.3 (2017 15:19)  HR: 75 (2017 03:58) (64 - 75)  BP: 98/51 (2017 03:58) (98/51 - 152/63)  BP(mean): --  RR: 16 (2017 03:58) (16 - 18)  SpO2: 97% (2017 03:58) (96% - 100%)    Daily Height in cm: 162.56 (2017 15:19)    Daily     I&O's Summary    2017 07:01  -  2017 07:00  --------------------------------------------------------  IN: 910 mL / OUT: 975 mL / NET: -65 mL          Data                          11.7   8.5   )-----------( 200      ( 2017 20:03 )             34.4       06-30    144  |  111<H>  |  17  ----------------------------<  91  4.5   |  27  |  0.54    Ca    8.1<L>      2017 20:03                      PT/INR - ( 2017 15:23 )   PT: 11.3 sec;   INR: 1.05 ratio

## 2017-07-02 ENCOUNTER — TRANSCRIPTION ENCOUNTER (OUTPATIENT)
Age: 77
End: 2017-07-02

## 2017-07-02 LAB
ANION GAP SERPL CALC-SCNC: 6 MMOL/L — SIGNIFICANT CHANGE UP (ref 5–17)
APPEARANCE UR: CLEAR — SIGNIFICANT CHANGE UP
BILIRUB UR-MCNC: NEGATIVE — SIGNIFICANT CHANGE UP
BUN SERPL-MCNC: 9 MG/DL — SIGNIFICANT CHANGE UP (ref 7–23)
CALCIUM SERPL-MCNC: 8.6 MG/DL — SIGNIFICANT CHANGE UP (ref 8.5–10.1)
CHLORIDE SERPL-SCNC: 106 MMOL/L — SIGNIFICANT CHANGE UP (ref 96–108)
CO2 SERPL-SCNC: 28 MMOL/L — SIGNIFICANT CHANGE UP (ref 22–31)
COLOR SPEC: YELLOW — SIGNIFICANT CHANGE UP
CREAT SERPL-MCNC: 0.48 MG/DL — LOW (ref 0.5–1.3)
DIFF PNL FLD: NEGATIVE — SIGNIFICANT CHANGE UP
GLUCOSE SERPL-MCNC: 110 MG/DL — HIGH (ref 70–99)
GLUCOSE UR QL: NEGATIVE MG/DL — SIGNIFICANT CHANGE UP
HCT VFR BLD CALC: 32.1 % — LOW (ref 34.5–45)
HGB BLD-MCNC: 10.4 G/DL — LOW (ref 11.5–15.5)
INR BLD: 1.17 RATIO — HIGH (ref 0.88–1.16)
KETONES UR-MCNC: (no result)
LEUKOCYTE ESTERASE UR-ACNC: NEGATIVE — SIGNIFICANT CHANGE UP
MCHC RBC-ENTMCNC: 30 PG — SIGNIFICANT CHANGE UP (ref 27–34)
MCHC RBC-ENTMCNC: 32.4 GM/DL — SIGNIFICANT CHANGE UP (ref 32–36)
MCV RBC AUTO: 92.7 FL — SIGNIFICANT CHANGE UP (ref 80–100)
NITRITE UR-MCNC: NEGATIVE — SIGNIFICANT CHANGE UP
PH UR: 6 — SIGNIFICANT CHANGE UP (ref 5–8)
PLATELET # BLD AUTO: 199 K/UL — SIGNIFICANT CHANGE UP (ref 150–400)
POTASSIUM SERPL-MCNC: 3.9 MMOL/L — SIGNIFICANT CHANGE UP (ref 3.5–5.3)
POTASSIUM SERPL-SCNC: 3.9 MMOL/L — SIGNIFICANT CHANGE UP (ref 3.5–5.3)
PROT UR-MCNC: NEGATIVE MG/DL — SIGNIFICANT CHANGE UP
PROTHROM AB SERPL-ACNC: 12.7 SEC — SIGNIFICANT CHANGE UP (ref 9.8–12.7)
RBC # BLD: 3.47 M/UL — LOW (ref 3.8–5.2)
RBC # FLD: 19.4 % — HIGH (ref 10.3–14.5)
SODIUM SERPL-SCNC: 140 MMOL/L — SIGNIFICANT CHANGE UP (ref 135–145)
SP GR SPEC: 1.01 — SIGNIFICANT CHANGE UP (ref 1.01–1.02)
UROBILINOGEN FLD QL: NEGATIVE MG/DL — SIGNIFICANT CHANGE UP
WBC # BLD: 11.8 K/UL — HIGH (ref 3.8–10.5)
WBC # FLD AUTO: 11.8 K/UL — HIGH (ref 3.8–10.5)

## 2017-07-02 PROCEDURE — 93970 EXTREMITY STUDY: CPT | Mod: 26

## 2017-07-02 PROCEDURE — 71010: CPT | Mod: 26

## 2017-07-02 PROCEDURE — 99233 SBSQ HOSP IP/OBS HIGH 50: CPT

## 2017-07-02 RX ORDER — KETOROLAC TROMETHAMINE 30 MG/ML
15 SYRINGE (ML) INJECTION ONCE
Qty: 0 | Refills: 0 | Status: DISCONTINUED | OUTPATIENT
Start: 2017-07-02 | End: 2017-07-02

## 2017-07-02 RX ORDER — ENOXAPARIN SODIUM 100 MG/ML
40 INJECTION SUBCUTANEOUS
Qty: 28 | Refills: 0 | OUTPATIENT
Start: 2017-07-02 | End: 2017-07-30

## 2017-07-02 RX ORDER — ACETAMINOPHEN 500 MG
1000 TABLET ORAL ONCE
Qty: 0 | Refills: 0 | Status: COMPLETED | OUTPATIENT
Start: 2017-07-02

## 2017-07-02 RX ORDER — ACETAMINOPHEN 500 MG
1000 TABLET ORAL ONCE
Qty: 0 | Refills: 0 | Status: COMPLETED | OUTPATIENT
Start: 2017-07-02 | End: 2017-07-03

## 2017-07-02 RX ADMIN — Medication 100 MILLIGRAM(S): at 05:37

## 2017-07-02 RX ADMIN — Medication 650 MILLIGRAM(S): at 06:47

## 2017-07-02 RX ADMIN — Medication 100 MILLIGRAM(S): at 17:13

## 2017-07-02 RX ADMIN — PANTOPRAZOLE SODIUM 40 MILLIGRAM(S): 20 TABLET, DELAYED RELEASE ORAL at 11:38

## 2017-07-02 RX ADMIN — SODIUM CHLORIDE 75 MILLILITER(S): 9 INJECTION, SOLUTION INTRAVENOUS at 00:17

## 2017-07-02 RX ADMIN — Medication 1 MILLIGRAM(S): at 11:38

## 2017-07-02 RX ADMIN — Medication 650 MILLIGRAM(S): at 11:37

## 2017-07-02 RX ADMIN — SODIUM CHLORIDE 3 MILLILITER(S): 9 INJECTION INTRAMUSCULAR; INTRAVENOUS; SUBCUTANEOUS at 05:38

## 2017-07-02 RX ADMIN — Medication 1 TABLET(S): at 11:38

## 2017-07-02 RX ADMIN — Medication 325 MILLIGRAM(S): at 17:12

## 2017-07-02 RX ADMIN — SENNA PLUS 2 TABLET(S): 8.6 TABLET ORAL at 20:32

## 2017-07-02 RX ADMIN — POLYETHYLENE GLYCOL 3350 17 GRAM(S): 17 POWDER, FOR SOLUTION ORAL at 11:36

## 2017-07-02 RX ADMIN — SODIUM CHLORIDE 3 MILLILITER(S): 9 INJECTION INTRAMUSCULAR; INTRAVENOUS; SUBCUTANEOUS at 13:20

## 2017-07-02 RX ADMIN — Medication 650 MILLIGRAM(S): at 17:19

## 2017-07-02 RX ADMIN — HYDROMORPHONE HYDROCHLORIDE 1 MILLIGRAM(S): 2 INJECTION INTRAMUSCULAR; INTRAVENOUS; SUBCUTANEOUS at 17:15

## 2017-07-02 RX ADMIN — SODIUM CHLORIDE 3 MILLILITER(S): 9 INJECTION INTRAMUSCULAR; INTRAVENOUS; SUBCUTANEOUS at 23:16

## 2017-07-02 RX ADMIN — Medication 650 MILLIGRAM(S): at 00:37

## 2017-07-02 RX ADMIN — Medication 650 MILLIGRAM(S): at 05:36

## 2017-07-02 RX ADMIN — OXYCODONE HYDROCHLORIDE 10 MILLIGRAM(S): 5 TABLET ORAL at 13:57

## 2017-07-02 RX ADMIN — Medication 25 MILLIGRAM(S): at 05:37

## 2017-07-02 RX ADMIN — Medication 100 MILLIGRAM(S): at 17:12

## 2017-07-02 RX ADMIN — Medication 500 MILLIGRAM(S): at 05:37

## 2017-07-02 RX ADMIN — OXYCODONE HYDROCHLORIDE 10 MILLIGRAM(S): 5 TABLET ORAL at 14:19

## 2017-07-02 RX ADMIN — HYDROMORPHONE HYDROCHLORIDE 1 MILLIGRAM(S): 2 INJECTION INTRAMUSCULAR; INTRAVENOUS; SUBCUTANEOUS at 11:14

## 2017-07-02 RX ADMIN — GABAPENTIN 100 MILLIGRAM(S): 400 CAPSULE ORAL at 20:33

## 2017-07-02 RX ADMIN — HYDROMORPHONE HYDROCHLORIDE 1 MILLIGRAM(S): 2 INJECTION INTRAMUSCULAR; INTRAVENOUS; SUBCUTANEOUS at 00:38

## 2017-07-02 RX ADMIN — HYDROMORPHONE HYDROCHLORIDE 1 MILLIGRAM(S): 2 INJECTION INTRAMUSCULAR; INTRAVENOUS; SUBCUTANEOUS at 12:20

## 2017-07-02 RX ADMIN — Medication 15 MILLIGRAM(S): at 20:32

## 2017-07-02 RX ADMIN — PREGABALIN 1000 MICROGRAM(S): 225 CAPSULE ORAL at 11:40

## 2017-07-02 RX ADMIN — Medication 650 MILLIGRAM(S): at 17:13

## 2017-07-02 RX ADMIN — Medication 1 PUFF(S): at 08:07

## 2017-07-02 RX ADMIN — GABAPENTIN 100 MILLIGRAM(S): 400 CAPSULE ORAL at 13:21

## 2017-07-02 RX ADMIN — ESCITALOPRAM OXALATE 20 MILLIGRAM(S): 10 TABLET, FILM COATED ORAL at 11:39

## 2017-07-02 RX ADMIN — Medication 325 MILLIGRAM(S): at 11:38

## 2017-07-02 RX ADMIN — ATORVASTATIN CALCIUM 20 MILLIGRAM(S): 80 TABLET, FILM COATED ORAL at 20:33

## 2017-07-02 RX ADMIN — GABAPENTIN 100 MILLIGRAM(S): 400 CAPSULE ORAL at 05:36

## 2017-07-02 RX ADMIN — Medication 100 MILLIGRAM(S): at 05:36

## 2017-07-02 RX ADMIN — Medication 650 MILLIGRAM(S): at 12:57

## 2017-07-02 RX ADMIN — Medication 500 MILLIGRAM(S): at 17:14

## 2017-07-02 RX ADMIN — Medication 650 MILLIGRAM(S): at 02:25

## 2017-07-02 RX ADMIN — HYDROMORPHONE HYDROCHLORIDE 1 MILLIGRAM(S): 2 INJECTION INTRAMUSCULAR; INTRAVENOUS; SUBCUTANEOUS at 11:13

## 2017-07-02 RX ADMIN — HYDROMORPHONE HYDROCHLORIDE 1 MILLIGRAM(S): 2 INJECTION INTRAMUSCULAR; INTRAVENOUS; SUBCUTANEOUS at 16:22

## 2017-07-02 RX ADMIN — ENOXAPARIN SODIUM 40 MILLIGRAM(S): 100 INJECTION SUBCUTANEOUS at 11:39

## 2017-07-02 RX ADMIN — OXYCODONE HYDROCHLORIDE 10 MILLIGRAM(S): 5 TABLET ORAL at 14:57

## 2017-07-02 NOTE — DISCHARGE NOTE ADULT - HOSPITAL COURSE
The patient is a 76F status post elective total hip arthroplasty to the right hip after failing outpatient nonoperative conservative management.  Patient presented to [facility] after being medically cleared for an elective surgical procedure. The patient was taken to the operating room on date mentioned above. Prophylactic antibiotics were started before the procedure and continued for 24 hours.  There were no complications during the procedure and patient tolerated the procedure well.  The patient was transferred to recovery room in stable condition and subsequently to surgical floor.  Patient was placed on _ for anticoagulation.  All home medications were continued.  The patient received physical therapy daily and daily labs were followed.  The dressing was kept clean, dry, and intact. Patient had a fever post-operatively, which had a CXR, LE dopplers, and UA performed. The rest of the hospital stay was unremarkable. H&P:  Pt is a 76y Female PMH: CAD, HTN, PVD, COPD, GI Bleed, Carotid endarterectomy  now s/p Right Total Hip Arthroplasty. Pt is with stable vital signs. Pain is controlled. Alert and Oriented. Exam reveals intact EHL FHL TA GS, +DP. Dressing is clean and dry with a New Aquacel bandage on.    Vital Signs Last 24 Hrs  T(C): 37.4 (07-05-17 @ 07:25), Max: 38 (07-04-17 @ 21:50)  T(F): 99.3 (07-05-17 @ 07:25), Max: 100.4 (07-04-17 @ 21:50)  HR: 90 (07-05-17 @ 07:25) (74 - 90)  BP: 118/54 (07-05-17 @ 05:46) (111/55 - 118/54)  BP(mean): --  RR: 16 (07-05-17 @ 07:25) (16 - 16)  SpO2: 99% (07-05-17 @ 09:34) (90% - 100%)                        9.3    10.6  )-----------( 255      ( 05 Jul 2017 06:00 )             29.0     PT/INR - ( 04 Jul 2017 05:47 )   PT: 10.7 sec;   INR: 0.99 ratio             Hospital Course:  Patient presented to [facility] after being medically cleared for an elective surgical procedure, having failed outpatient non-operative conservative management. Prophylactic antibiotics were started before the procedure and continued for 24 hours. They were admitted after surgery to the orthopedic floor.   There were no complications during the hospital stay. All home medications were continued.  Routine consults were obtained from the Anticoagulation Team for DVT/PE prophylaxis, from Physical Therapy for twice daily PT starting on POD 0, and from the Hospitalist for Medical Co-management. Patient was placed on Lovenox SC for anticoagulation.  Pertinent home medications were continued.  Daily labs were followed.      On POD 0 the pt was OOB with PT and there were no overnight events. POD1 the hemovac drain was removed. POD 2, PT was continued, and on POD3 a new Aquacel dressing was applied.  Patient had a low grade fever post-operatively, which had a CXR, LE dopplers, and UA performed. She was treated in PO ceftin and seen by ID for suspected UTI vs atelectasis  The rest of the hospital stay was unremarkable.  The pt is ready today for DC to Rehab for ongoing PT.  The orthopedic Attending is aware and agrees.

## 2017-07-02 NOTE — PROGRESS NOTE ADULT - SUBJECTIVE AND OBJECTIVE BOX
Pt c/o Rt hip pain, fever last night and early this morning.  She denies any cough or urinary symptoms.    Vital Signs Last 24 Hrs  T(C): 39 (02 Jul 2017 05:10), Max: 39.2 (02 Jul 2017 00:10)  T(F): 102.2 (02 Jul 2017 05:10), Max: 102.6 (02 Jul 2017 00:10)  HR: 62 (02 Jul 2017 08:07) (62 - 84)  BP: 102/51 (02 Jul 2017 05:10) (98/59 - 107/46)  BP(mean): --  RR: 16 (02 Jul 2017 05:10) (16 - 16)  SpO2: 95% (02 Jul 2017 05:10) (93% - 95%)    Daily     Daily     I&O's Detail    01 Jul 2017 07:01  -  02 Jul 2017 07:00  --------------------------------------------------------  IN:    IV PiggyBack: 50 mL    lactated ringers.: 640 mL    Oral Fluid: 600 mL    Other: 145 mL  Total IN: 1435 mL    OUT:    Drain: 20 mL    Voided: 4 mL  Total OUT: 24 mL    Total NET: 1411 mL          CAPILLARY BLOOD GLUCOSE                                          10.4   11.8  )-----------( 199      ( 02 Jul 2017 06:24 )             32.1       07-02    140  |  106  |  9   ----------------------------<  110<H>  3.9   |  28  |  0.48<L>    Ca    8.6      02 Jul 2017 06:24        PT/INR - ( 02 Jul 2017 06:24 )   PT: 12.7 sec;   INR: 1.17 ratio                         MEDICATIONS  (STANDING):  sodium chloride 0.9% lock flush 3 milliLiter(s) IV Push every 8 hours  lactated ringers. 1000 milliLiter(s) (75 mL/Hr) IV Continuous <Continuous>  pantoprazole    Tablet 40 milliGRAM(s) Oral daily  polyethylene glycol 3350 17 Gram(s) Oral daily  ferrous    sulfate 325 milliGRAM(s) Oral three times a day with meals  folic acid 1 milliGRAM(s) Oral daily  multivitamin 1 Tablet(s) Oral daily  ascorbic acid 500 milliGRAM(s) Oral two times a day  pregabalin 100 milliGRAM(s) Oral two times a day  escitalopram 20 milliGRAM(s) Oral daily  atorvastatin 20 milliGRAM(s) Oral at bedtime  metoprolol succinate ER 25 milliGRAM(s) Oral daily  fluticasone propionate   110 MICROgram(s) HFA Inhaler 1 Puff(s) Inhalation daily  levothyroxine 112 MICROGram(s) Oral every other day  cyanocobalamin 1000 MICROGram(s) Oral daily  ropivacaine 0.2% in 0.9% Sodium Chloride PCRA 200 milliLiter(s) Regional Catheter PCA Continuous  acetaminophen   Tablet. 650 milliGRAM(s) Oral every 6 hours  gabapentin 100 milliGRAM(s) Oral every 8 hours  docusate sodium 100 milliGRAM(s) Oral every 12 hours  senna 2 Tablet(s) Oral at bedtime  levothyroxine 125 MICROGram(s) Oral every other day  enoxaparin Injectable 40 milliGRAM(s) SubCutaneous daily    MEDICATIONS  (PRN):  acetaminophen   Tablet 650 milliGRAM(s) Oral every 6 hours PRN For Temp over 38.3 C (100.94 F)  acetaminophen   Tablet 650 milliGRAM(s) Oral every 6 hours PRN headache  oxyCODONE IR 5 milliGRAM(s) Oral every 4 hours PRN Mild Pain  oxyCODONE IR 10 milliGRAM(s) Oral every 4 hours PRN Moderate Pain  aluminum hydroxide/magnesium hydroxide/simethicone Suspension 30 milliLiter(s) Oral four times a day PRN Indigestion  ondansetron Injectable 4 milliGRAM(s) IV Push every 6 hours PRN Nausea and/or Vomiting  bisacodyl Suppository 10 milliGRAM(s) Rectal daily PRN If no bowel movement by POD#2  benzocaine 15 mG/menthol 3.6 mG Lozenge 1 Lozenge Oral every 4 hours PRN Sore Throat  diphenhydrAMINE   Capsule 25 milliGRAM(s) Oral at bedtime PRN Insomnia  ALPRAZolam 0.5 milliGRAM(s) Oral four times a day PRN anxiety  ALBUTerol    90 MICROgram(s) HFA Inhaler 1 Puff(s) Inhalation every 6 hours PRN Shortness of Breath and/or Wheezing  ketorolac   Injectable 15 milliGRAM(s) IV Push every 6 hours PRN breakthrough  HYDROmorphone  Injectable 1 milliGRAM(s) IV Push every 4 hours PRN Severe Pain (7 - 10)

## 2017-07-02 NOTE — DISCHARGE NOTE ADULT - PATIENT PORTAL LINK FT
“You can access the Follow[employer] Patient Portal, offered by [facility], by registering with the following website: [email or web address]”

## 2017-07-02 NOTE — DISCHARGE NOTE ADULT - MEDICATION SUMMARY - MEDICATIONS TO STOP TAKING
I will STOP taking the medications listed below when I get home from the hospital:    celecoxib 200 mg oral capsule  -- 1 cap(s) by mouth once a day  -- pt instructed to stop 1 week prior to procedure.

## 2017-07-02 NOTE — DISCHARGE NOTE ADULT - CARE PROVIDER_API CALL
Sohan Collado (MD), Orthopaedic Surgery  [address] [place] [address]  Phone: [phone]  Fax: [phone]

## 2017-07-02 NOTE — PROGRESS NOTE ADULT - SUBJECTIVE AND OBJECTIVE BOX
Patient S&E at bedside. Pain controlled. No acute events overnight.     Vital Signs Last 24 Hrs  T(C): 39 (02 Jul 2017 05:10), Max: 39.2 (02 Jul 2017 00:10)  T(F): 102.2 (02 Jul 2017 05:10), Max: 102.6 (02 Jul 2017 00:10)  HR: 84 (02 Jul 2017 05:10) (82 - 85)  BP: 102/51 (02 Jul 2017 05:10) (98/59 - 113/62)  BP(mean): --  RR: 16 (02 Jul 2017 05:10) (16 - 17)  SpO2: 95% (02 Jul 2017 05:10) (93% - 97%)    Gen: NAD  Right Lower Extremity:  Dsg c/d/i  SILT L2-S1  +EHL/FHL/TA/Gastroc  DP+  Soft compartments, - calf ttp    Assessment and Plan:     A/P: 76F s/p R FABIANO POD 2  Analgesia  Tylenol given for fever   DVT ppx  WBAT  PT/OT  DC planning Patient S&E at bedside. Pain controlled. No acute events overnight.     Vital Signs Last 24 Hrs  T(C): 39 (02 Jul 2017 05:10), Max: 39.2 (02 Jul 2017 00:10)  T(F): 102.2 (02 Jul 2017 05:10), Max: 102.6 (02 Jul 2017 00:10)  HR: 84 (02 Jul 2017 05:10) (82 - 85)  BP: 102/51 (02 Jul 2017 05:10) (98/59 - 113/62)  BP(mean): --  RR: 16 (02 Jul 2017 05:10) (16 - 17)  SpO2: 95% (02 Jul 2017 05:10) (93% - 97%)    Gen: NAD  Right Lower Extremity:  Dsg c/d/i  SILT L2-S1  +EHL/FHL/TA/Gastroc  DP+  Soft compartments, - calf ttp    Assessment and Plan:     A/P: 76F s/p R FABIANO POD 2  Analgesia  Tylenol given for fever   DVT ppx  WBAT in KI  PT/OT  DC planning Patient S&E at bedside. Pain controlled. No acute events overnight.     Vital Signs Last 24 Hrs  T(C): 39 (02 Jul 2017 05:10), Max: 39.2 (02 Jul 2017 00:10)  T(F): 102.2 (02 Jul 2017 05:10), Max: 102.6 (02 Jul 2017 00:10)  HR: 84 (02 Jul 2017 05:10) (82 - 85)  BP: 102/51 (02 Jul 2017 05:10) (98/59 - 113/62)  BP(mean): --  RR: 16 (02 Jul 2017 05:10) (16 - 17)  SpO2: 95% (02 Jul 2017 05:10) (93% - 97%)    Gen: NAD  Right Lower Extremity:  Dsg c/d/i  SILT L2-S1  +EHL/FHL/TA/Gastroc  DP+  Soft compartments, - calf ttp    Assessment and Plan:     A/P: 76F s/p R FABIANO POD 2  Analgesia  Tylenol given for fever   DVT ppx  WBAT - posterior hip precautions  PT/OT  DC planning- [facility] today Patient S&E at bedside. Pain controlled. No acute events overnight.     Vital Signs Last 24 Hrs  T(C): 39 (02 Jul 2017 05:10), Max: 39.2 (02 Jul 2017 00:10)  T(F): 102.2 (02 Jul 2017 05:10), Max: 102.6 (02 Jul 2017 00:10)  HR: 84 (02 Jul 2017 05:10) (82 - 85)  BP: 102/51 (02 Jul 2017 05:10) (98/59 - 113/62)  BP(mean): --  RR: 16 (02 Jul 2017 05:10) (16 - 17)  SpO2: 95% (02 Jul 2017 05:10) (93% - 97%)    Gen: NAD  Right Lower Extremity:  Dsg c/d/i  SILT L2-S1  +EHL/FHL/TA/Gastroc  DP+  Soft compartments, - calf ttp    Assessment and Plan:     A/P: 76F s/p R FABIANO POD 2    FU CXR and UA  Appreciate medical recommendations  Analgesia  Tylenol given for fever   DVT ppx  WBAT - posterior hip precautions  PT/OT  DC planning- [facility] today

## 2017-07-02 NOTE — DISCHARGE NOTE ADULT - CARE PLAN
Principal Discharge DX:	Osteoarthritis of hip  Goal:	Return to baseline ADLs  Instructions for follow-up, activity and diet:	1.	Pain control  2.	Walking with full weight bearing as tolerated, with assistive devices as needed.  3.	DVT prophylaxis  4.	Physical therapy as needed  5.	Follow up with Dr. Collado as outpatient in 10-14 Days after discharge from the hospital or rehab. Call office for appointment.  6.	Remove old and place new Aquacel bandage every 7 days until staples removed. Remove staples post-op day 14.  7.	Ice and elevate affected area as needed  8.	Keep dressing clean, dry and intact  9.	Posterior hip precautions - Abduction pillow while seated or supine. Do not bend hip past 90 degrees. Do not turn knee/foot inward. Do not cross leg past middle of your body Principal Discharge DX:	Osteoarthritis of hip  Goal:	Return to baseline ADLs  Instructions for follow-up, activity and diet:	1.	Pain control  2.	Walking with full weight bearing as tolerated, with rolling walker and assist  3.	DVT prophylaxis: Lovenox  4.	Physical therapy daily  5.	Follow up with Dr. Collado as outpatient in 10-14 Days after discharge from the hospital or rehab. Call office for appointment.  6.	Remove old and place new Aquacel bandage every 7 days until staples removed. Remove staples post-op day 14 (7/14/17) as long as wound is healed.  7.	Ice and elevate affected area as needed  8.	Keep dressing clean, dry and intact  9.	Posterior hip precautions - Abduction pillow while seated or supine. Do not bend hip past 90 degrees. Do not turn knee/foot inward. Do not cross leg past middle of your body

## 2017-07-02 NOTE — PROGRESS NOTE ADULT - SUBJECTIVE AND OBJECTIVE BOX
HPI:  Patient is a 76y old  Female who presents with a chief complaint of right hip pain, is now s/p right total hip replacement on  with Dr. Collado. Past medical history significant for CEA 2016 complicated by extensive GI bleed (10/2016) prior due to 4 days of Effient therapy as pre-operative prophylaxis. GI bleeding required 4 day ICU stay, per patient, multiple transfusions. Per patient, upper endo and colonoscopy performed, no cauterization occurred- denies any other surgical intervention. Is seen by gastro Dr. Tan in [facility] and has not had any recurrence of bleed, but is being monitored and still receiving Iron infusions for anemia.   PMH also significant for COPD, CAD, spinal stenosis, and PVD with stents.      Consulted by Dr. Collado for VTE prophylaxis, risk stratification, and anticoagulation management.    PAST MEDICAL & SURGICAL HISTORY:  Frozen shoulder syndrome: right  Antalgic gait  CAD (coronary artery disease)  Anemia  Gallstones  Sciatic pain, right  Lumbar disc disorder  Osteoporosis  COPD (chronic obstructive pulmonary disease)  Neuropathy  Osteoarthritis of hip: right  History of GI bleed: 10/4/2016  Anxiety and depression  PVD (peripheral vascular disease): stents LLE  Dyslipidemia  Hypothyroid  Hypertension  S/P carotid endarterectomy: right; 2017  S/P ovarian cystectomy: with appendectomy;   S/P  section: X2 ,       BMI: 23.7    CrCl: 91.8    Caprini VTE Risk Score: 9 (high)    IMPROVE Bleeding Risk Score: 1.5 (low)     Falls Risk:   High ( x )  Mod (  )  Low (  )    17: Patient seen at bedside. See HPI. Discussed medical history of GI bleed 7 months prior. Spoke with Dr. Canales as well. Patient high thrombosis risk due to surgery, COPD, and age. However due to GI bleed induced by anticoagulant- will choose safest option for VTE. Discussed Lovenox Sq once daily. Dr. Canales in agreement with therapy as it it least likely to cause bleeding, but proven effective post THR VTE prophylaxis.  : Patient seen at bedside while performing bedside dopplers. Fever overnight.    FAMILY HISTORY:  Family history of CVA (Sibling): brother; s/p fall;  age 69.  Maternal family history of emphysema (Mother)  Family history of lung cancer (Father): father    Denies any personal or familial history of clotting or bleeding disorders.    Allergies    Cipro (Hives)  clindamycin (Hives)  Flagyl (Hives)  oxycodone (Hives)  Plavix (Hives)  sulfa drugs (Hives)  vancomycin (Hives)    Intolerances    Effient (Other)      REVIEW OF SYSTEMS    (  )Fever	     (  )Constipation	(  )SOB				(  )Headache	(  )Dysuria  (  )Chills	     (  )Melena	(  )Dyspnea present on exertion	                    (  )Dizziness                    (  )Polyuria  (  )Nausea	     (  )Hematochezia	(  )Cough			                    (  )Syncope   	(  )Hematuria  (  )Vomiting    (  )Chest Pain	(  )Wheezing			(  )Weakness  (  )Diarrhea     (  )Palpitations	(  )Anorexia			(  )Myalgia    All other review of systems negative: Yes      PHYSICAL EXAM:    Constitutional: Appears Well    Neurological: A& O x 3    Skin: Warm    Respiratory and Thorax: normal effort; Breath sounds: normal; No rales/wheezing/rhonchi  	  Cardiovascular: S1, S2, regular, NMBR	    Gastrointestinal: BS + x 4Q, nontender	    Genitourinary:  Bladder nondistended, nontender    Musculoskeletal:   General Right:   +muscle/joint tenderness,   normal tone, no joint swelling,   ROM: limited	    General Left:   no muscle/joint tenderness,   normal tone, no joint swelling,   ROM: full    Hip:  Right: Dressing CDI- aquacels x 2      Lower extrems:   Right: no calf tenderness              negative arturo's sign               + pedal pulses    Left:   no calf tenderness              negative arturo's sign               + pedal pulses                            10.4   11.8  )-----------( 199      ( 2017 06:24 )             32.1       07-02    140  |  106  |  9   ----------------------------<  110<H>  3.9   |  28  |  0.48<L>    Ca    8.6      2017 06:24        PT/INR - ( 2017 06:24 )   PT: 12.7 sec;   INR: 1.17 ratio                             10.6   10.8  )-----------( 193      ( 2017 07:27 )             33.3       07-01    140  |  109<H>  |  14  ----------------------------<  100<H>  3.9   |  25  |  0.45<L>    Ca    8.2<L>      2017 07:27        PT/INR - ( 2017 08:58 )   PT: 11.4 sec;   INR: 1.05 ratio         				    MEDICATIONS  (STANDING):  sodium chloride 0.9% lock flush 3 milliLiter(s) IV Push every 8 hours  lactated ringers. 1000 milliLiter(s) IV Continuous <Continuous>  pantoprazole    Tablet 40 milliGRAM(s) Oral daily  polyethylene glycol 3350 17 Gram(s) Oral daily  ferrous    sulfate 325 milliGRAM(s) Oral three times a day with meals  folic acid 1 milliGRAM(s) Oral daily  multivitamin 1 Tablet(s) Oral daily  ascorbic acid 500 milliGRAM(s) Oral two times a day  pregabalin 100 milliGRAM(s) Oral two times a day  escitalopram 20 milliGRAM(s) Oral daily  atorvastatin 20 milliGRAM(s) Oral at bedtime  metoprolol succinate ER 25 milliGRAM(s) Oral daily  fluticasone propionate   110 MICROgram(s) HFA Inhaler 1 Puff(s) Inhalation daily  levothyroxine 112 MICROGram(s) Oral every other day  cyanocobalamin 1000 MICROGram(s) Oral daily  ropivacaine 0.2% in 0.9% Sodium Chloride PCRA 200 milliLiter(s) Regional Catheter PCA Continuous  acetaminophen   Tablet. 650 milliGRAM(s) Oral every 6 hours  gabapentin 100 milliGRAM(s) Oral every 8 hours  docusate sodium 100 milliGRAM(s) Oral every 12 hours  senna 2 Tablet(s) Oral at bedtime  levothyroxine 125 MICROGram(s) Oral every other day  enoxaparin Injectable 40 milliGRAM(s) SubCutaneous daily      Vital Signs Last 24 Hrs  T(C): 37.1 (17 @ 09:33), Max: 39.2 (17 @ 00:10)  T(F): 98.8 (17 @ 09:33), Max: 102.6 (17 @ 00:10)  HR: 71 (17 @ 09:33) (62 - 84)  BP: 113/47 (17 @ 09:33) (98/59 - 113/47)  BP(mean): --  RR: 16 (17 @ 09:33) (16 - 16)  SpO2: 96% (17 @ 09:33) (93% - 96%)    DVT Prophylaxis:  LMWH                   ( x )  Heparin SQ           (  )  Coumadin             (  )  Xarelto                  (  )  Eliquis                   (  )  Venodynes           (x  )  Ambulation          (x  )  UFH                       (  )  Contraindicated  (  )

## 2017-07-02 NOTE — DISCHARGE NOTE ADULT - MEDICATION SUMMARY - MEDICATIONS TO TAKE
I will START or STAY ON the medications listed below when I get home from the hospital:    Aspirin Enteric Coated 81 mg oral delayed release tablet  -- 1 tab(s) by mouth once a day  -- pt instructed to follow Dr. Campos pre-procedure instructions.  -- Indication: For home med    traMADol 50 mg oral tablet  -- 1 tab(s) by mouth every 4 hours as needed for pain  -- Indication: For pain    enoxaparin 40 mg/0.4 mL injectable solution  -- Inject syringe into abdomen once a day under direction of [facility] [phone]  -- It is very important that you take or use this exactly as directed.  Do not skip doses or discontinue unless directed by your doctor.    -- Indication: For dvt ppx    Lyrica 100 mg oral capsule  -- 1 cap(s) by mouth 2 times a day  -- Indication: For home med    Lexapro 20 mg oral tablet  -- 1 tab(s) by mouth once a day  -- Indication: For home med    ondansetron 4 mg oral tablet  -- 1 tab(s) by mouth every 8 hours, As Needed  -- Indication: For home med    Crestor 5 mg oral tablet  -- 1 tab(s) by mouth once a day (at bedtime)  -- Indication: For home med    ALPRAZolam 0.5 mg oral tablet  -- 1 tab(s) by mouth 4 times a day, As Needed  -- Indication: For home med    zolpidem 5 mg oral tablet  -- 1 tab(s) by mouth once a day (at bedtime), As Needed  -- Indication: For home med    metoprolol succinate 25 mg oral tablet, extended release  -- 1 tab(s) by mouth once a day  -- Indication: For home med    albuterol 2.5 mg/3 mL (0.083%) inhalation solution  -- 3 milliliter(s) inhaled every 6 hours, As Needed  -- over a month ago  -- Indication: For home med    fluticasone-vilanterol 100 mcg-25 mcg/inh inhalation powder  -- 1 puff(s) inhaled once a day  -- Indication: For home med    Pepcid 20 mg oral tablet  -- 1 tab(s) by mouth 2 times a day  -- Indication: For home med    Synthroid 112 mcg (0.112 mg) oral tablet  -- 1 tab(s) by mouth every other day  -- Indication: For home med    Synthroid 125 mcg (0.125 mg) oral tablet  -- 1 tab(s) by mouth every other day  -- Indication: For home med    Vitamin C 500 mg oral tablet  -- 1 tab(s) by mouth once a day  -- pt instructed to stop 1 week prior to procedure.  -- Indication: For home med    Vitamin D3 2000 intl units oral capsule  -- 1 cap(s) by mouth once a day  -- pt instructed to stop 1 week prior to procedure.  -- Indication: For home med    cyanocobalamin 1000 mcg oral tablet  -- 1 tab(s) by mouth once a day  -- Indication: For home med I will START or STAY ON the medications listed below when I get home from the hospital:    Aspirin Enteric Coated 81 mg oral delayed release tablet  -- 1 tab(s) by mouth once a day  -- pt instructed to follow Dr. Campos pre-procedure instructions.  -- Indication: For home med    traMADol 50 mg oral tablet  -- 1 tab(s) by mouth every 4 hours as needed for pain  -- Indication: For pain    acetaminophen 325 mg oral tablet  -- 2 tab(s) by mouth every 6 hours, As needed, mild pain  -- Indication: For mild pain    enoxaparin 40 mg/0.4 mL injectable solution  -- Inject syringe into abdomen once a day, stop on 7/30/17 total of 30 days per AC TEAM  -- It is very important that you take or use this exactly as directed.  Do not skip doses or discontinue unless directed by your doctor.    -- Indication: For blood clot prevention, last dose 7/30/17, total of 30 days    Lyrica 100 mg oral capsule  -- 1 cap(s) by mouth 2 times a day  -- Indication: For home med    Lexapro 20 mg oral tablet  -- 1 tab(s) by mouth once a day  -- Indication: For home med    ondansetron 4 mg oral tablet  -- 1 tab(s) by mouth every 8 hours, As Needed  -- Indication: For home med    Crestor 5 mg oral tablet  -- 1 tab(s) by mouth once a day (at bedtime)  -- Indication: For home med    ALPRAZolam 0.5 mg oral tablet  -- 1 tab(s) by mouth 4 times a day, As Needed  -- Indication: For home med    zolpidem 5 mg oral tablet  -- 1 tab(s) by mouth once a day (at bedtime), As Needed  -- Indication: For home med    metoprolol succinate 25 mg oral tablet, extended release  -- 1 tab(s) by mouth once a day  -- Indication: For home med    albuterol 2.5 mg/3 mL (0.083%) inhalation solution  -- 3 milliliter(s) inhaled every 6 hours, As Needed  -- over a month ago  -- Indication: For home med    fluticasone-vilanterol 100 mcg-25 mcg/inh inhalation powder  -- 1 puff(s) inhaled once a day  -- Indication: For home med    cefuroxime 250 mg oral tablet  -- 1 tab(s) by mouth every 12 hours for 2 more days, per ID/Medicine, fevers  -- Indication: For suspect UTI, 2 more days, stop on seb of 7/7/17.    Pepcid 20 mg oral tablet  -- 1 tab(s) by mouth 2 times a day  -- Indication: For home med    docusate sodium 100 mg oral capsule  -- 1 cap(s) by mouth every 12 hours while on narcotic pain med, otherwise stop  -- Indication: For stool softener    polyethylene glycol 3350 oral powder for reconstitution  -- 17 gram(s) by mouth once a day, As Needed for constipation  -- Indication: For stool softener    senna oral tablet  -- 2 tab(s) by mouth once a day (at bedtime), As Needed for constipation  -- Indication: For stool softener    Synthroid 112 mcg (0.112 mg) oral tablet  -- 1 tab(s) by mouth every other day  -- Indication: For home med    Synthroid 125 mcg (0.125 mg) oral tablet  -- 1 tab(s) by mouth every other day  -- Indication: For home med    Vitamin C 500 mg oral tablet  -- 1 tab(s) by mouth once a day  -- pt instructed to stop 1 week prior to procedure.  -- Indication: For home med    Vitamin D3 2000 intl units oral capsule  -- 1 cap(s) by mouth once a day  -- pt instructed to stop 1 week prior to procedure.  -- Indication: For home med    cyanocobalamin 1000 mcg oral tablet  -- 1 tab(s) by mouth once a day  -- Indication: For home med

## 2017-07-02 NOTE — DISCHARGE NOTE ADULT - PLAN OF CARE
Return to baseline ADLs 1.	Pain control  2.	Walking with full weight bearing as tolerated, with assistive devices as needed.  3.	DVT prophylaxis  4.	Physical therapy as needed  5.	Follow up with Dr. Collado as outpatient in 10-14 Days after discharge from the hospital or rehab. Call office for appointment.  6.	Remove old and place new Aquacel bandage every 7 days until staples removed. Remove staples post-op day 14.  7.	Ice and elevate affected area as needed  8.	Keep dressing clean, dry and intact  9.	Posterior hip precautions - Abduction pillow while seated or supine. Do not bend hip past 90 degrees. Do not turn knee/foot inward. Do not cross leg past middle of your body 1.	Pain control  2.	Walking with full weight bearing as tolerated, with rolling walker and assist  3.	DVT prophylaxis: Lovenox  4.	Physical therapy daily  5.	Follow up with Dr. Collado as outpatient in 10-14 Days after discharge from the hospital or rehab. Call office for appointment.  6.	Remove old and place new Aquacel bandage every 7 days until staples removed. Remove staples post-op day 14 (7/14/17) as long as wound is healed.  7.	Ice and elevate affected area as needed  8.	Keep dressing clean, dry and intact  9.	Posterior hip precautions - Abduction pillow while seated or supine. Do not bend hip past 90 degrees. Do not turn knee/foot inward. Do not cross leg past middle of your body

## 2017-07-03 LAB
ANION GAP SERPL CALC-SCNC: 6 MMOL/L — SIGNIFICANT CHANGE UP (ref 5–17)
APPEARANCE UR: CLEAR — SIGNIFICANT CHANGE UP
BILIRUB UR-MCNC: NEGATIVE — SIGNIFICANT CHANGE UP
BUN SERPL-MCNC: 13 MG/DL — SIGNIFICANT CHANGE UP (ref 7–23)
CALCIUM SERPL-MCNC: 8.5 MG/DL — SIGNIFICANT CHANGE UP (ref 8.5–10.1)
CHLORIDE SERPL-SCNC: 106 MMOL/L — SIGNIFICANT CHANGE UP (ref 96–108)
CO2 SERPL-SCNC: 29 MMOL/L — SIGNIFICANT CHANGE UP (ref 22–31)
COLOR SPEC: YELLOW — SIGNIFICANT CHANGE UP
CREAT SERPL-MCNC: 0.55 MG/DL — SIGNIFICANT CHANGE UP (ref 0.5–1.3)
DIFF PNL FLD: NEGATIVE — SIGNIFICANT CHANGE UP
GLUCOSE SERPL-MCNC: 103 MG/DL — HIGH (ref 70–99)
GLUCOSE UR QL: NEGATIVE MG/DL — SIGNIFICANT CHANGE UP
HCT VFR BLD CALC: 29.4 % — LOW (ref 34.5–45)
HGB BLD-MCNC: 9.6 G/DL — LOW (ref 11.5–15.5)
INR BLD: 1.09 RATIO — SIGNIFICANT CHANGE UP (ref 0.88–1.16)
KETONES UR-MCNC: NEGATIVE — SIGNIFICANT CHANGE UP
LEUKOCYTE ESTERASE UR-ACNC: NEGATIVE — SIGNIFICANT CHANGE UP
MCHC RBC-ENTMCNC: 30.3 PG — SIGNIFICANT CHANGE UP (ref 27–34)
MCHC RBC-ENTMCNC: 32.6 GM/DL — SIGNIFICANT CHANGE UP (ref 32–36)
MCV RBC AUTO: 92.8 FL — SIGNIFICANT CHANGE UP (ref 80–100)
NITRITE UR-MCNC: NEGATIVE — SIGNIFICANT CHANGE UP
PH UR: 6 — SIGNIFICANT CHANGE UP (ref 5–8)
PLATELET # BLD AUTO: 182 K/UL — SIGNIFICANT CHANGE UP (ref 150–400)
POTASSIUM SERPL-MCNC: 4.2 MMOL/L — SIGNIFICANT CHANGE UP (ref 3.5–5.3)
POTASSIUM SERPL-SCNC: 4.2 MMOL/L — SIGNIFICANT CHANGE UP (ref 3.5–5.3)
PROT UR-MCNC: NEGATIVE MG/DL — SIGNIFICANT CHANGE UP
PROTHROM AB SERPL-ACNC: 11.8 SEC — SIGNIFICANT CHANGE UP (ref 9.8–12.7)
RBC # BLD: 3.17 M/UL — LOW (ref 3.8–5.2)
RBC # FLD: 19 % — HIGH (ref 10.3–14.5)
SODIUM SERPL-SCNC: 141 MMOL/L — SIGNIFICANT CHANGE UP (ref 135–145)
SP GR SPEC: 1.01 — SIGNIFICANT CHANGE UP (ref 1.01–1.02)
UROBILINOGEN FLD QL: NEGATIVE MG/DL — SIGNIFICANT CHANGE UP
WBC # BLD: 12.7 K/UL — HIGH (ref 3.8–10.5)
WBC # FLD AUTO: 12.7 K/UL — HIGH (ref 3.8–10.5)

## 2017-07-03 PROCEDURE — 71010: CPT | Mod: 26

## 2017-07-03 PROCEDURE — 99233 SBSQ HOSP IP/OBS HIGH 50: CPT

## 2017-07-03 RX ORDER — CEFUROXIME AXETIL 250 MG
250 TABLET ORAL EVERY 12 HOURS
Qty: 0 | Refills: 0 | Status: DISCONTINUED | OUTPATIENT
Start: 2017-07-03 | End: 2017-07-05

## 2017-07-03 RX ORDER — IPRATROPIUM/ALBUTEROL SULFATE 18-103MCG
3 AEROSOL WITH ADAPTER (GRAM) INHALATION EVERY 6 HOURS
Qty: 0 | Refills: 0 | Status: DISCONTINUED | OUTPATIENT
Start: 2017-07-03 | End: 2017-07-05

## 2017-07-03 RX ORDER — ASPIRIN/CALCIUM CARB/MAGNESIUM 324 MG
81 TABLET ORAL DAILY
Qty: 0 | Refills: 0 | Status: DISCONTINUED | OUTPATIENT
Start: 2017-07-03 | End: 2017-07-05

## 2017-07-03 RX ORDER — CEFTRIAXONE 500 MG/1
1 INJECTION, POWDER, FOR SOLUTION INTRAMUSCULAR; INTRAVENOUS ONCE
Qty: 0 | Refills: 0 | Status: COMPLETED | OUTPATIENT
Start: 2017-07-03 | End: 2017-07-03

## 2017-07-03 RX ADMIN — SODIUM CHLORIDE 3 MILLILITER(S): 9 INJECTION INTRAMUSCULAR; INTRAVENOUS; SUBCUTANEOUS at 06:45

## 2017-07-03 RX ADMIN — Medication 650 MILLIGRAM(S): at 18:20

## 2017-07-03 RX ADMIN — OXYCODONE HYDROCHLORIDE 10 MILLIGRAM(S): 5 TABLET ORAL at 18:22

## 2017-07-03 RX ADMIN — Medication 100 MILLIGRAM(S): at 18:20

## 2017-07-03 RX ADMIN — Medication 500 MILLIGRAM(S): at 18:20

## 2017-07-03 RX ADMIN — OXYCODONE HYDROCHLORIDE 10 MILLIGRAM(S): 5 TABLET ORAL at 12:27

## 2017-07-03 RX ADMIN — Medication 1 MILLIGRAM(S): at 11:22

## 2017-07-03 RX ADMIN — OXYCODONE HYDROCHLORIDE 10 MILLIGRAM(S): 5 TABLET ORAL at 21:08

## 2017-07-03 RX ADMIN — Medication 3 MILLILITER(S): at 13:07

## 2017-07-03 RX ADMIN — Medication 25 MILLIGRAM(S): at 06:32

## 2017-07-03 RX ADMIN — PREGABALIN 1000 MICROGRAM(S): 225 CAPSULE ORAL at 11:22

## 2017-07-03 RX ADMIN — PANTOPRAZOLE SODIUM 40 MILLIGRAM(S): 20 TABLET, DELAYED RELEASE ORAL at 11:22

## 2017-07-03 RX ADMIN — ENOXAPARIN SODIUM 40 MILLIGRAM(S): 100 INJECTION SUBCUTANEOUS at 11:22

## 2017-07-03 RX ADMIN — Medication 325 MILLIGRAM(S): at 09:08

## 2017-07-03 RX ADMIN — Medication 100 MILLIGRAM(S): at 06:32

## 2017-07-03 RX ADMIN — Medication 81 MILLIGRAM(S): at 11:22

## 2017-07-03 RX ADMIN — Medication 1 TABLET(S): at 11:22

## 2017-07-03 RX ADMIN — SENNA PLUS 2 TABLET(S): 8.6 TABLET ORAL at 21:09

## 2017-07-03 RX ADMIN — Medication 650 MILLIGRAM(S): at 18:21

## 2017-07-03 RX ADMIN — SODIUM CHLORIDE 3 MILLILITER(S): 9 INJECTION INTRAMUSCULAR; INTRAVENOUS; SUBCUTANEOUS at 21:12

## 2017-07-03 RX ADMIN — Medication 500 MILLIGRAM(S): at 06:32

## 2017-07-03 RX ADMIN — Medication 325 MILLIGRAM(S): at 18:20

## 2017-07-03 RX ADMIN — POLYETHYLENE GLYCOL 3350 17 GRAM(S): 17 POWDER, FOR SOLUTION ORAL at 06:38

## 2017-07-03 RX ADMIN — Medication 125 MICROGRAM(S): at 11:27

## 2017-07-03 RX ADMIN — HYDROMORPHONE HYDROCHLORIDE 1 MILLIGRAM(S): 2 INJECTION INTRAMUSCULAR; INTRAVENOUS; SUBCUTANEOUS at 00:44

## 2017-07-03 RX ADMIN — Medication 650 MILLIGRAM(S): at 06:32

## 2017-07-03 RX ADMIN — Medication 3 MILLILITER(S): at 19:58

## 2017-07-03 RX ADMIN — ATORVASTATIN CALCIUM 20 MILLIGRAM(S): 80 TABLET, FILM COATED ORAL at 21:08

## 2017-07-03 RX ADMIN — Medication 650 MILLIGRAM(S): at 20:08

## 2017-07-03 RX ADMIN — GABAPENTIN 100 MILLIGRAM(S): 400 CAPSULE ORAL at 06:32

## 2017-07-03 RX ADMIN — OXYCODONE HYDROCHLORIDE 10 MILLIGRAM(S): 5 TABLET ORAL at 22:00

## 2017-07-03 RX ADMIN — Medication 1 PUFF(S): at 09:56

## 2017-07-03 RX ADMIN — Medication 250 MILLIGRAM(S): at 18:19

## 2017-07-03 RX ADMIN — Medication 400 MILLIGRAM(S): at 00:59

## 2017-07-03 RX ADMIN — CEFTRIAXONE 100 GRAM(S): 500 INJECTION, POWDER, FOR SOLUTION INTRAMUSCULAR; INTRAVENOUS at 02:17

## 2017-07-03 RX ADMIN — SODIUM CHLORIDE 3 MILLILITER(S): 9 INJECTION INTRAMUSCULAR; INTRAVENOUS; SUBCUTANEOUS at 15:10

## 2017-07-03 RX ADMIN — ESCITALOPRAM OXALATE 20 MILLIGRAM(S): 10 TABLET, FILM COATED ORAL at 11:22

## 2017-07-03 RX ADMIN — GABAPENTIN 100 MILLIGRAM(S): 400 CAPSULE ORAL at 14:05

## 2017-07-03 RX ADMIN — Medication 650 MILLIGRAM(S): at 11:21

## 2017-07-03 RX ADMIN — GABAPENTIN 100 MILLIGRAM(S): 400 CAPSULE ORAL at 21:09

## 2017-07-03 NOTE — PROGRESS NOTE ADULT - SUBJECTIVE AND OBJECTIVE BOX
HPI:  Patient is a 76y old  Female who presents with a chief complaint of right hip pain, is now s/p right total hip replacement on  with Dr. Collado. Past medical history significant for CEA 2016 complicated by extensive GI bleed (10/2016) prior due to 4 days of Effient therapy as pre-operative prophylaxis. GI bleeding required 4 day ICU stay, per patient, multiple transfusions. Per patient, upper endo and colonoscopy performed, no cauterization occurred- denies any other surgical intervention. Is seen by gastro Dr. Tan in [facility] and has not had any recurrence of bleed, but is being monitored and still receiving Iron infusions for anemia.   PMH also significant for COPD, CAD, spinal stenosis, and PVD with stents.      Consulted by Dr. Collado for VTE prophylaxis, risk stratification, and anticoagulation management.    PAST MEDICAL & SURGICAL HISTORY:  Frozen shoulder syndrome: right  Antalgic gait  CAD (coronary artery disease)  Anemia  Gallstones  Sciatic pain, right  Lumbar disc disorder  Osteoporosis  COPD (chronic obstructive pulmonary disease)  Neuropathy  Osteoarthritis of hip: right  History of GI bleed: 10/4/2016  Anxiety and depression  PVD (peripheral vascular disease): stents LLE  Dyslipidemia  Hypothyroid  Hypertension  S/P carotid endarterectomy: right; 2017  S/P ovarian cystectomy: with appendectomy;   S/P  section: X2 ,       BMI: 23.7    CrCl: 91.8    Caprini VTE Risk Score: 9 (high)    IMPROVE Bleeding Risk Score: 1.5 (low)     Falls Risk:   High ( x )  Mod (  )  Low (  )    17: Patient seen at bedside. See HPI. Discussed medical history of GI bleed 7 months prior. Spoke with Dr. Canales as well. Patient high thrombosis risk due to surgery, COPD, and age. However due to GI bleed induced by anticoagulant- will choose safest option for VTE. Discussed Lovenox Sq once daily. Dr. Canales in agreement with therapy as it it least likely to cause bleeding, but proven effective post THR VTE prophylaxis.  : Patient seen at bedside while performing bedside dopplers. Fever overnight.  7/3/17:  seen at bedside, svitlana giang    FAMILY HISTORY:  Family history of CVA (Sibling): brother; s/p fall;  age 69.  Maternal family history of emphysema (Mother)  Family history of lung cancer (Father): father    Denies any personal or familial history of clotting or bleeding disorders.    Allergies    Cipro (Hives)  clindamycin (Hives)  Flagyl (Hives)  oxycodone (Hives)  Plavix (Hives)  sulfa drugs (Hives)  vancomycin (Hives)    Intolerances    Effient (Other)      REVIEW OF SYSTEMS    (  )Fever	     (  )Constipation	(  )SOB				(  )Headache	(  )Dysuria  (  )Chills	     (  )Melena	(  )Dyspnea present on exertion	                    (  )Dizziness                    (  )Polyuria  (  )Nausea	     (  )Hematochezia	(  )Cough			                    (  )Syncope   	(  )Hematuria  (  )Vomiting    (  )Chest Pain	(  )Wheezing			(  )Weakness  (  )Diarrhea     (  )Palpitations	(  )Anorexia			(  )Myalgia    All other review of systems negative: Yes      PHYSICAL EXAM:    Constitutional: Appears Well    Neurological: A& O x 3    Skin: Warm    Respiratory and Thorax: normal effort; Breath sounds: normal; No rales/wheezing/rhonchi  	  Cardiovascular: S1, S2, regular, NMBR	    Gastrointestinal: BS + x 4Q, nontender	    Genitourinary:  Bladder nondistended, nontender    Musculoskeletal:   General Right:   +muscle/joint tenderness,   normal tone, no joint swelling,   ROM: limited	    General Left:   no muscle/joint tenderness,   normal tone, no joint swelling,   ROM: full    Hip:  Right: Dressing CDI- aquacels x 2      Lower extrems:   Right: no calf tenderness              negative arturo's sign               + pedal pulses    Left:   no calf tenderness              negative arturo's sign               + pedal pulses                            9.6    12.7  )-----------( 182      ( 2017 06:12 )             29.4       07-03    141  |  106  |  13  ----------------------------<  103<H>  4.2   |  29  |  0.55    Ca    8.5      2017 06:12                                   10.4   11.8  )-----------( 199      ( 2017 06:24 )             32.1       07-02    140  |  106  |  9   ----------------------------<  110<H>  3.9   |  28  |  0.48<L>    Ca    8.6      2017 06:24    PT/INR - ( 2017 06:12 )   PT: 11.8 sec;   INR: 1.09 ratio        PT/INR - ( 2017 06:24 )   PT: 12.7 sec;   INR: 1.17 ratio                             10.6   10.8  )-----------( 193      ( 2017 07:27 )             33.3       07-01    140  |  109<H>  |  14  ----------------------------<  100<H>  3.9   |  25  |  0.45<L>    Ca    8.2<L>      2017 07:27        PT/INR - ( 2017 08:58 )   PT: 11.4 sec;   INR: 1.05 ratio         				    MEDICATIONS  (STANDING):  sodium chloride 0.9% lock flush 3 milliLiter(s) IV Push every 8 hours  lactated ringers. 1000 milliLiter(s) IV Continuous <Continuous>  pantoprazole    Tablet 40 milliGRAM(s) Oral daily  polyethylene glycol 3350 17 Gram(s) Oral daily  ferrous    sulfate 325 milliGRAM(s) Oral three times a day with meals  folic acid 1 milliGRAM(s) Oral daily  multivitamin 1 Tablet(s) Oral daily  ascorbic acid 500 milliGRAM(s) Oral two times a day  pregabalin 100 milliGRAM(s) Oral two times a day  escitalopram 20 milliGRAM(s) Oral daily  atorvastatin 20 milliGRAM(s) Oral at bedtime  metoprolol succinate ER 25 milliGRAM(s) Oral daily  fluticasone propionate   110 MICROgram(s) HFA Inhaler 1 Puff(s) Inhalation daily  levothyroxine 112 MICROGram(s) Oral every other day  cyanocobalamin 1000 MICROGram(s) Oral daily  ropivacaine 0.2% in 0.9% Sodium Chloride PCRA 200 milliLiter(s) Regional Catheter PCA Continuous  acetaminophen   Tablet. 650 milliGRAM(s) Oral every 6 hours  gabapentin 100 milliGRAM(s) Oral every 8 hours  docusate sodium 100 milliGRAM(s) Oral every 12 hours  senna 2 Tablet(s) Oral at bedtime  levothyroxine 125 MICROGram(s) Oral every other day  enoxaparin Injectable 40 milliGRAM(s) SubCutaneous daily      Vital Signs Last 24 Hrs  T(C): 37.3 (17 @ 07:04), Max: 39.3 (17 @ 19:32)  T(F): 99.2 (17 @ 07:04), Max: 102.7 (17 @ 19:32)  HR: 86 (17 @ 09:57) (68 - 94)  BP: 133/77 (17 @ 07:04) (101/52 - 133/77)  BP(mean): --  RR: 16 (17 @ 07:04) (16 - 16)  SpO2: 96% (17 @ 09:57) (95% - 99%)      DVT Prophylaxis:  LMWH                   ( x )  Heparin SQ           (  )  Coumadin             (  )  Xarelto                  (  )  Eliquis                   (  )  Venodynes           (x  )  Ambulation          (x  )  UFH                       (  )  Contraindicated  (  )

## 2017-07-03 NOTE — CONSULT NOTE ADULT - SUBJECTIVE AND OBJECTIVE BOX
HPI:Patient is 76 year old female with past medical history arthritis, hypertension, hypothyroidism, admitted on  for elective right hip replacement to relieve the pain brought on by severe osteoarthritis; she has done well post operatively however over last 48 hours has been having fevers. She notes mild non productive cough, though she is using incentive spirometer, has mild right groin pain, that she thought might be the beginings of an early urinary tract infection. She has no other specific complaints. The drain was removed from her incision today and a new dressing placed.      PMH: as above  PSH: as above  Meds: per reconcilation sheet, noted below  MEDICATIONS  (STANDING):  sodium chloride 0.9% lock flush 3 milliLiter(s) IV Push every 8 hours  lactated ringers. 1000 milliLiter(s) (75 mL/Hr) IV Continuous <Continuous>  pantoprazole    Tablet 40 milliGRAM(s) Oral daily  polyethylene glycol 3350 17 Gram(s) Oral daily  ferrous    sulfate 325 milliGRAM(s) Oral three times a day with meals  folic acid 1 milliGRAM(s) Oral daily  multivitamin 1 Tablet(s) Oral daily  ascorbic acid 500 milliGRAM(s) Oral two times a day  pregabalin 100 milliGRAM(s) Oral two times a day  escitalopram 20 milliGRAM(s) Oral daily  atorvastatin 20 milliGRAM(s) Oral at bedtime  metoprolol succinate ER 25 milliGRAM(s) Oral daily  fluticasone propionate   110 MICROgram(s) HFA Inhaler 1 Puff(s) Inhalation daily  levothyroxine 112 MICROGram(s) Oral every other day  cyanocobalamin 1000 MICROGram(s) Oral daily  ropivacaine 0.2% in 0.9% Sodium Chloride PCRA 200 milliLiter(s) Regional Catheter PCA Continuous  acetaminophen   Tablet. 650 milliGRAM(s) Oral every 6 hours  gabapentin 100 milliGRAM(s) Oral every 8 hours  docusate sodium 100 milliGRAM(s) Oral every 12 hours  senna 2 Tablet(s) Oral at bedtime  levothyroxine 125 MICROGram(s) Oral every other day  enoxaparin Injectable 40 milliGRAM(s) SubCutaneous daily  aspirin  chewable 81 milliGRAM(s) Oral daily  ALBUTerol/ipratropium for Nebulization 3 milliLiter(s) Nebulizer every 6 hours  cefuroxime   Tablet 250 milliGRAM(s) Oral every 12 hours    MEDICATIONS  (PRN):  acetaminophen   Tablet 650 milliGRAM(s) Oral every 6 hours PRN For Temp over 38.3 C (100.94 F)  acetaminophen   Tablet 650 milliGRAM(s) Oral every 6 hours PRN headache  oxyCODONE IR 5 milliGRAM(s) Oral every 4 hours PRN Mild Pain  oxyCODONE IR 10 milliGRAM(s) Oral every 4 hours PRN Moderate Pain  aluminum hydroxide/magnesium hydroxide/simethicone Suspension 30 milliLiter(s) Oral four times a day PRN Indigestion  ondansetron Injectable 4 milliGRAM(s) IV Push every 6 hours PRN Nausea and/or Vomiting  bisacodyl Suppository 10 milliGRAM(s) Rectal daily PRN If no bowel movement by POD#2  benzocaine 15 mG/menthol 3.6 mG Lozenge 1 Lozenge Oral every 4 hours PRN Sore Throat  diphenhydrAMINE   Capsule 25 milliGRAM(s) Oral at bedtime PRN Insomnia  ALPRAZolam 0.5 milliGRAM(s) Oral four times a day PRN anxiety  HYDROmorphone  Injectable 1 milliGRAM(s) IV Push every 4 hours PRN Severe Pain (7 - 10)    Allergies    Cipro (Hives)  clindamycin (Hives)  Flagyl (Hives)  oxycodone (Hives)  Plavix (Hives)  sulfa drugs (Hives)  vancomycin (Hives)    Intolerances    Effient (Other)    Social: no smoking, no alcohol, no illegal drugs; no recent travel, no exposure to TB  FAMILY HISTORY:  Family history of CVA (Sibling): brother; s/p fall;  age 69.  Maternal family history of emphysema (Mother)  Family history of lung cancer (Father): father    ROS: the patient has no chills, no HA, no dizziness, no sore throat, no blurry vision, no CP, no palpitations, no SOB, no abdominal pain, no diarrhea, no N/V, no dysuria, no leg pain, no claudication, no rash, no joint aches, no rectal pain or bleeding, no night sweats  Vital Signs Last 24 Hrs  T(C): 37.3 (2017 07:04), Max: 39.3 (2017 19:32)  T(F): 99.2 (2017 07:04), Max: 102.7 (2017 19:32)  HR: 86 (2017 09:57) (68 - 94)  BP: 133/77 (2017 07:04) (101/52 - 133/77)  BP(mean): --  RR: 16 (2017 07:04) (16 - 16)  SpO2: 96% (2017 09:57) (95% - 99%)  Daily     Daily   Constitutional: frail looking  HEENT: NC/AT, EOMI, PERRLA  Neck: supple  Respiratory: right basilar wheeze  Cardiovascular: S1S2 regular, no murmurs  Abdomen: soft, not tender, not distended, positive BS  Genitourinary: deferred  Rectal: deferred  Musculoskeletal:right hip with dressing in tact  Neurological: AxOx3  Skin: no rashes                          9.6    12.7  )-----------( 182      ( 2017 06:12 )             29.4     07-03    141  |  106  |  13  ----------------------------<  103<H>  4.2   |  29  |  0.55    Ca    8.5      2017 06:12         Urinalysis Basic - ( 2017 05:40 )    Color: Yellow / Appearance: Clear / S.010 / pH: x  Gluc: x / Ketone: Negative  / Bili: Negative / Urobili: Negative mg/dL   Blood: x / Protein: Negative mg/dL / Nitrite: Negative   Leuk Esterase: Negative / RBC: x / WBC x   Sq Epi: x / Non Sq Epi: x / Bacteria: x          Radiology:< from: Xray Chest 1 View AP/PA. (17 @ 09:50) >    EXAM:  CHEST SINGLE VIEW FRONTAL                            PROCEDURE DATE:  2017        INTERPRETATION:  Exam Date: 2017 9:50 AM    History: Fever    Technique: Single frontal portable view of the chest with comparison to    2017    Findings:    The heart is mildly prominent.  The lungs are grossly clear. The apices   and hemidiaphragms are unremarkable. Degenerative changes of the   visualized osseous structures.        Impression:    No acute disease     No significant interval change as compared to  2017      < end of copied text >      Advanced directive addressed: full resuscitation

## 2017-07-03 NOTE — CONSULT NOTE ADULT - ASSESSMENT
:Patient is 76 year old female with past medical history arthritis, hypertension, hypothyroidism, admitted on 6/30 for elective right hip replacement to relieve the pain brought on by severe osteoarthritis; she has done well post operatively however over last 48 hours has been having fevers. She notes mild non productive cough, though she is using incentive spirometer, has mild right groin pain, that she thought might be the beginings of an early urinary tract infection. She has no other specific complaints. The drain was removed from her incision today and a new dressing placed.  1. Post op fever in this patient admitted for elective right hip replacement  -given wheezing at right lung base this may represent atelectasis and early bronchitis as source of fever, also am concerned about possible early urinary tract infection given patient complaints  - panculture in progresss  -chest xray pending  - encouraged incentive spirometry  - received ceftriaxone  -will continue antibiotics with a short course of ceftin 250 mg po q 12 hours  -wound care per surgery  2. other issues; hypertension, hypothyroidism  - per medicine
This is a 76 year old female s/p right total hip replacement on 6/30 with Dr. Collado, who is high risk for thrombosis due to age, surgery, immobility, and COPD; high risk for bleeding due to recent GI bleed from oral anticoagulant. After discussion with primary team- recommend Lovenox 40 mg SQ daily as safest option for  VTE prophylaxis for this patient.    Plan:  ::D/C ECASA 325mg PO BID  ::Start Lovenox 40mg SQ daily today and continue x 30 days  ::Monitor for s/s of bleeding closely  ::Daily CBC/BMP  ::B/l venodynes  ::Enc ambulation per PT    Thank you for this consult, will continue to follow.
Pt is a 77 y/o female with h/o anemia, CAD, osteoporosis, COPD, neuropathy, chronic LBP, PVD s/p LLE stent, hyperlipidemia, hypothyroidism, osteoarthritis who has been having increasing right hip pain which has affected her ADLS and quality of life.  She was having difficulty going up stairs, getting dressed therefore she was admitted for elective Rt total hip arthroplasty.  Post-op medicine consult called for comanagement.     * Rt Hip Osteoarthritis-s/p Rt FABIANO, continue pain control, PT, monitor post-op labs, encourage use of incentive spirometry, DVT proph.  * Acute Blood Loss Anemia-surgical loss, monitor, continue po iron.  * CAD-stable, hold baby aspirin since she is on higher dose for proph, continue metoprolol based on parameters, statin  * Hypothyroidism-continue synthroid  * Hypotension-asymptomatic, likely due to anesthesia, pain meds, acute blood loss, monitor for now, increase IVF.  * Anxiety- Lexapro alprazolam  * Proph- aspirin, await anticoagulation consult  * Tobacco Use-smoking cessation reviewed.  * Disp-OOB, PT  * Comm-d/w pt, RN

## 2017-07-03 NOTE — PROGRESS NOTE ADULT - SUBJECTIVE AND OBJECTIVE BOX
Pt is still having fevers, early this morning I recieved a call from RN stated patient had pain in her Lt lower abdomen like she has a stone/infection, I ordered repeat u/a and rocephine 1 dose.  Pt still having fevers, denies any CP, SOB, dysuria, hematuria, denies any ongoing abdominal pain.    Vital Signs Last 24 Hrs  T(C): 37.3 (2017 07:04), Max: 39.3 (2017 19:32)  T(F): 99.2 (2017 07:04), Max: 102.7 (2017 19:32)  HR: 86 (2017 09:57) (68 - 94)  BP: 133/77 (2017 07:04) (101/52 - 133/77)  BP(mean): --  RR: 16 (2017 07:04) (16 - 16)  SpO2: 96% (2017 09:57) (95% - 99%)    Daily     Daily     I&O's Detail    2017 07:01  -  2017 07:00  --------------------------------------------------------  IN:    lactated ringers.: 600 mL    Oral Fluid: 600 mL  Total IN: 1200 mL    OUT:    Other: 164 mL    Voided: 600 mL  Total OUT: 764 mL    Total NET: 436 mL          CAPILLARY BLOOD GLUCOSE                                          9.6    12.7  )-----------( 182      ( 2017 06:12 )             29.4       07-03    141  |  106  |  13  ----------------------------<  103<H>  4.2   |  29  |  0.55    Ca    8.5      2017 06:12        PT/INR - ( 2017 06:12 )   PT: 11.8 sec;   INR: 1.09 ratio                 Urinalysis Basic - ( 2017 05:40 )    Color: Yellow / Appearance: Clear / S.010 / pH: x  Gluc: x / Ketone: Negative  / Bili: Negative / Urobili: Negative mg/dL   Blood: x / Protein: Negative mg/dL / Nitrite: Negative   Leuk Esterase: Negative / RBC: x / WBC x   Sq Epi: x / Non Sq Epi: x / Bacteria: x            MEDICATIONS  (STANDING):  sodium chloride 0.9% lock flush 3 milliLiter(s) IV Push every 8 hours  lactated ringers. 1000 milliLiter(s) (75 mL/Hr) IV Continuous <Continuous>  pantoprazole    Tablet 40 milliGRAM(s) Oral daily  polyethylene glycol 3350 17 Gram(s) Oral daily  ferrous    sulfate 325 milliGRAM(s) Oral three times a day with meals  folic acid 1 milliGRAM(s) Oral daily  multivitamin 1 Tablet(s) Oral daily  ascorbic acid 500 milliGRAM(s) Oral two times a day  pregabalin 100 milliGRAM(s) Oral two times a day  escitalopram 20 milliGRAM(s) Oral daily  atorvastatin 20 milliGRAM(s) Oral at bedtime  metoprolol succinate ER 25 milliGRAM(s) Oral daily  fluticasone propionate   110 MICROgram(s) HFA Inhaler 1 Puff(s) Inhalation daily  levothyroxine 112 MICROGram(s) Oral every other day  cyanocobalamin 1000 MICROGram(s) Oral daily  ropivacaine 0.2% in 0.9% Sodium Chloride PCRA 200 milliLiter(s) Regional Catheter PCA Continuous  acetaminophen   Tablet. 650 milliGRAM(s) Oral every 6 hours  gabapentin 100 milliGRAM(s) Oral every 8 hours  docusate sodium 100 milliGRAM(s) Oral every 12 hours  senna 2 Tablet(s) Oral at bedtime  levothyroxine 125 MICROGram(s) Oral every other day  enoxaparin Injectable 40 milliGRAM(s) SubCutaneous daily    MEDICATIONS  (PRN):  acetaminophen   Tablet 650 milliGRAM(s) Oral every 6 hours PRN For Temp over 38.3 C (100.94 F)  acetaminophen   Tablet 650 milliGRAM(s) Oral every 6 hours PRN headache  oxyCODONE IR 5 milliGRAM(s) Oral every 4 hours PRN Mild Pain  oxyCODONE IR 10 milliGRAM(s) Oral every 4 hours PRN Moderate Pain  aluminum hydroxide/magnesium hydroxide/simethicone Suspension 30 milliLiter(s) Oral four times a day PRN Indigestion  ondansetron Injectable 4 milliGRAM(s) IV Push every 6 hours PRN Nausea and/or Vomiting  bisacodyl Suppository 10 milliGRAM(s) Rectal daily PRN If no bowel movement by POD#2  benzocaine 15 mG/menthol 3.6 mG Lozenge 1 Lozenge Oral every 4 hours PRN Sore Throat  diphenhydrAMINE   Capsule 25 milliGRAM(s) Oral at bedtime PRN Insomnia  ALPRAZolam 0.5 milliGRAM(s) Oral four times a day PRN anxiety  ALBUTerol    90 MICROgram(s) HFA Inhaler 1 Puff(s) Inhalation every 6 hours PRN Shortness of Breath and/or Wheezing  HYDROmorphone  Injectable 1 milliGRAM(s) IV Push every 4 hours PRN Severe Pain (7 - 10)

## 2017-07-03 NOTE — PROVIDER CONTACT NOTE (OTHER) - SITUATION
Pt c/o new onset lower abdominal pain. States "it feels like I have a kidney infection"
pt had fever of 102.4F, Dr. Canales notifed, administered 650mg Tyl, encouraged incentive spirometer/fluids, ice applied to pt, stat chest xray ordered, results pending

## 2017-07-03 NOTE — PROGRESS NOTE ADULT - SUBJECTIVE AND OBJECTIVE BOX
Patient S&E at bedside. Pain controlled. Patient states had chills last night and feels fatigued.     Vital Signs Last 24 Hrs  T(C): 37 (03 Jul 2017 04:16), Max: 39.3 (02 Jul 2017 19:32)  T(F): 98.6 (03 Jul 2017 04:16), Max: 102.7 (02 Jul 2017 19:32)  HR: 68 (03 Jul 2017 04:16) (62 - 94)  BP: 107/52 (03 Jul 2017 04:16) (101/52 - 130/76)  BP(mean): --  RR: 16 (03 Jul 2017 04:16) (16 - 16)  SpO2: 96% (03 Jul 2017 04:16) (96% - 99%)      Gen: NAD  Right Lower Extremity:  Dsg c/d/i  SILT L2-S1  +EHL/FHL/TA/Gastroc  DP+  Soft compartments, - calf ttp    Assessment and Plan:     A/P: 76F s/p R FABIANO POD 3    F/U temperature; appreciate medical recommendations. Given rocephin  Analgesia  Tylenol given for fever   DVT ppx  WBAT - posterior hip precautions  PT/OT  D/C planning - ortho stable for discharge

## 2017-07-04 LAB
ANION GAP SERPL CALC-SCNC: 7 MMOL/L — SIGNIFICANT CHANGE UP (ref 5–17)
BUN SERPL-MCNC: 10 MG/DL — SIGNIFICANT CHANGE UP (ref 7–23)
CALCIUM SERPL-MCNC: 8.7 MG/DL — SIGNIFICANT CHANGE UP (ref 8.5–10.1)
CHLORIDE SERPL-SCNC: 105 MMOL/L — SIGNIFICANT CHANGE UP (ref 96–108)
CO2 SERPL-SCNC: 29 MMOL/L — SIGNIFICANT CHANGE UP (ref 22–31)
CREAT SERPL-MCNC: 0.54 MG/DL — SIGNIFICANT CHANGE UP (ref 0.5–1.3)
GLUCOSE SERPL-MCNC: 156 MG/DL — HIGH (ref 70–99)
HCT VFR BLD CALC: 31.7 % — LOW (ref 34.5–45)
HGB BLD-MCNC: 10.1 G/DL — LOW (ref 11.5–15.5)
INR BLD: 0.99 RATIO — SIGNIFICANT CHANGE UP (ref 0.88–1.16)
MCHC RBC-ENTMCNC: 30.1 PG — SIGNIFICANT CHANGE UP (ref 27–34)
MCHC RBC-ENTMCNC: 32 GM/DL — SIGNIFICANT CHANGE UP (ref 32–36)
MCV RBC AUTO: 93.9 FL — SIGNIFICANT CHANGE UP (ref 80–100)
PLATELET # BLD AUTO: 264 K/UL — SIGNIFICANT CHANGE UP (ref 150–400)
POTASSIUM SERPL-MCNC: 3.5 MMOL/L — SIGNIFICANT CHANGE UP (ref 3.5–5.3)
POTASSIUM SERPL-SCNC: 3.5 MMOL/L — SIGNIFICANT CHANGE UP (ref 3.5–5.3)
PROTHROM AB SERPL-ACNC: 10.7 SEC — SIGNIFICANT CHANGE UP (ref 9.8–12.7)
RBC # BLD: 3.37 M/UL — LOW (ref 3.8–5.2)
RBC # FLD: 18.9 % — HIGH (ref 10.3–14.5)
SODIUM SERPL-SCNC: 141 MMOL/L — SIGNIFICANT CHANGE UP (ref 135–145)
WBC # BLD: 13.7 K/UL — HIGH (ref 3.8–10.5)
WBC # FLD AUTO: 13.7 K/UL — HIGH (ref 3.8–10.5)

## 2017-07-04 PROCEDURE — 99233 SBSQ HOSP IP/OBS HIGH 50: CPT

## 2017-07-04 RX ADMIN — Medication 100 MILLIGRAM(S): at 06:27

## 2017-07-04 RX ADMIN — Medication 1 TABLET(S): at 12:24

## 2017-07-04 RX ADMIN — PANTOPRAZOLE SODIUM 40 MILLIGRAM(S): 20 TABLET, DELAYED RELEASE ORAL at 12:25

## 2017-07-04 RX ADMIN — OXYCODONE HYDROCHLORIDE 10 MILLIGRAM(S): 5 TABLET ORAL at 19:30

## 2017-07-04 RX ADMIN — OXYCODONE HYDROCHLORIDE 10 MILLIGRAM(S): 5 TABLET ORAL at 08:56

## 2017-07-04 RX ADMIN — OXYCODONE HYDROCHLORIDE 10 MILLIGRAM(S): 5 TABLET ORAL at 13:34

## 2017-07-04 RX ADMIN — Medication 650 MILLIGRAM(S): at 06:26

## 2017-07-04 RX ADMIN — Medication 100 MILLIGRAM(S): at 17:26

## 2017-07-04 RX ADMIN — Medication 325 MILLIGRAM(S): at 17:26

## 2017-07-04 RX ADMIN — GABAPENTIN 100 MILLIGRAM(S): 400 CAPSULE ORAL at 13:34

## 2017-07-04 RX ADMIN — Medication 500 MILLIGRAM(S): at 06:27

## 2017-07-04 RX ADMIN — Medication 1 MILLIGRAM(S): at 12:25

## 2017-07-04 RX ADMIN — ESCITALOPRAM OXALATE 20 MILLIGRAM(S): 10 TABLET, FILM COATED ORAL at 12:25

## 2017-07-04 RX ADMIN — Medication 3 MILLILITER(S): at 15:03

## 2017-07-04 RX ADMIN — SODIUM CHLORIDE 3 MILLILITER(S): 9 INJECTION INTRAMUSCULAR; INTRAVENOUS; SUBCUTANEOUS at 06:26

## 2017-07-04 RX ADMIN — Medication 650 MILLIGRAM(S): at 06:30

## 2017-07-04 RX ADMIN — OXYCODONE HYDROCHLORIDE 10 MILLIGRAM(S): 5 TABLET ORAL at 14:04

## 2017-07-04 RX ADMIN — Medication 650 MILLIGRAM(S): at 12:24

## 2017-07-04 RX ADMIN — SENNA PLUS 2 TABLET(S): 8.6 TABLET ORAL at 21:54

## 2017-07-04 RX ADMIN — Medication 1 PUFF(S): at 07:50

## 2017-07-04 RX ADMIN — OXYCODONE HYDROCHLORIDE 10 MILLIGRAM(S): 5 TABLET ORAL at 08:26

## 2017-07-04 RX ADMIN — Medication 650 MILLIGRAM(S): at 12:54

## 2017-07-04 RX ADMIN — Medication 250 MILLIGRAM(S): at 17:26

## 2017-07-04 RX ADMIN — Medication 325 MILLIGRAM(S): at 12:25

## 2017-07-04 RX ADMIN — PREGABALIN 1000 MICROGRAM(S): 225 CAPSULE ORAL at 12:25

## 2017-07-04 RX ADMIN — Medication 3 MILLILITER(S): at 02:29

## 2017-07-04 RX ADMIN — Medication 25 MILLIGRAM(S): at 06:27

## 2017-07-04 RX ADMIN — GABAPENTIN 100 MILLIGRAM(S): 400 CAPSULE ORAL at 06:27

## 2017-07-04 RX ADMIN — Medication 3 MILLILITER(S): at 19:56

## 2017-07-04 RX ADMIN — GABAPENTIN 100 MILLIGRAM(S): 400 CAPSULE ORAL at 21:54

## 2017-07-04 RX ADMIN — SODIUM CHLORIDE 3 MILLILITER(S): 9 INJECTION INTRAMUSCULAR; INTRAVENOUS; SUBCUTANEOUS at 17:34

## 2017-07-04 RX ADMIN — Medication 250 MILLIGRAM(S): at 06:26

## 2017-07-04 RX ADMIN — Medication 500 MILLIGRAM(S): at 17:26

## 2017-07-04 RX ADMIN — ENOXAPARIN SODIUM 40 MILLIGRAM(S): 100 INJECTION SUBCUTANEOUS at 12:25

## 2017-07-04 RX ADMIN — Medication 81 MILLIGRAM(S): at 12:27

## 2017-07-04 RX ADMIN — SODIUM CHLORIDE 3 MILLILITER(S): 9 INJECTION INTRAMUSCULAR; INTRAVENOUS; SUBCUTANEOUS at 21:51

## 2017-07-04 RX ADMIN — ATORVASTATIN CALCIUM 20 MILLIGRAM(S): 80 TABLET, FILM COATED ORAL at 21:54

## 2017-07-04 RX ADMIN — POLYETHYLENE GLYCOL 3350 17 GRAM(S): 17 POWDER, FOR SOLUTION ORAL at 17:27

## 2017-07-04 RX ADMIN — Medication 325 MILLIGRAM(S): at 08:25

## 2017-07-04 NOTE — PROGRESS NOTE ADULT - SUBJECTIVE AND OBJECTIVE BOX
17: PT FEELS FINE -- DENIES ANY CHEST PAIN, SHORTNESS OF BREATH, COUGH, URINARY ISSUES OR DIARRHEA.  HAD A FEVER OVERNIGHT; RIGHT HIP PAIN IS CONTROLLED    ROS: AS PER HPI OTHERWISE ALL OTHER SYSTEMS REVIEWED AND ARE NEG      PHYSICAL EXAM:    Vital Signs Last 24 Hrs  T(C): 37.8 (2017 06:24), Max: 39.1 (2017 20:00)  T(F): 100.1 (2017 06:24), Max: 102.4 (2017 20:00)  HR: 82 (2017 06:24) (77 - 95)  BP: 104/50 (2017 06:24) (95/42 - 116/63)  BP(mean): --  RR: 16 (:24) (16 - 16)  SpO2: 94% (2017 06:24) (94% - 99%)  Vital Signs Last 24 Hrs  T(C): 37.8 (2017 06:24), Max: 39.1 (2017 20:00)  T(F): 100.1 (2017 06:24), Max: 102.4 (2017 20:00)  HR: 82 (2017 06:24) (77 - 95)  BP: 104/50 (2017 06:24) (95/42 - 116/63)  BP(mean): --  RR: 16 (:24) (16 - 16)  SpO2: 94% (2017 06:24) (94% - 99%)    GEN: A and O, NAD, mood stable  HEENT:   NC/AT, EOMI, no oropharyngeal lesions, erythema, exudates, oral thrush    NECK:   supple    CV:  +S1, +S2, regular, no murmurs or rubs    RESP:   lungs clear to auscultation bilaterally, POS SCATTERED WHEEZES MICHELLE, NO RHONCHI OR RALES APPRECIATED    GI:  abdomen soft, non-tender, non-distended, normal BS, no abdominal masses, no palpable masses    RECTAL:  not examined    :  not examined    MSK:   normal muscle tone, no atrophy, no rigidity, no contractions    EXT:   no clubbing, no cyanosis, POS RIGHT HIP EDEMA, DRESSING C/D/I no calf pain, swelling or erythema    VASCULAR:  pulses equal and symmetric in the upper and lower extremities    NEURO:  AAOX3, no focal neurological deficits, follows all commands, able to move extremities spontaneously    SKIN:  no ulcers, lesions or rashes      LABS:                            10.1   13.7  )-----------( 264      ( 2017 08:50 )             31.7     07-04    141  |  105  |  10  ----------------------------<  156<H>  3.5   |  29  |  0.54    Ca    8.7      2017 08:50            PT/INR - ( 2017 05:47 )   PT: 10.7 sec;   INR: 0.99 ratio           Urinalysis Basic - ( 2017 05:40 )    Color: Yellow / Appearance: Clear / S.010 / pH: x  Gluc: x / Ketone: Negative  / Bili: Negative / Urobili: Negative mg/dL   Blood: x / Protein: Negative mg/dL / Nitrite: Negative   Leuk Esterase: Negative / RBC: x / WBC x   Sq Epi: x / Non Sq Epi: x / Bacteria: x      MEDICATIONS  (STANDING):  sodium chloride 0.9% lock flush 3 milliLiter(s) IV Push every 8 hours  lactated ringers. 1000 milliLiter(s) (75 mL/Hr) IV Continuous <Continuous>  pantoprazole    Tablet 40 milliGRAM(s) Oral daily  polyethylene glycol 3350 17 Gram(s) Oral daily  ferrous    sulfate 325 milliGRAM(s) Oral three times a day with meals  folic acid 1 milliGRAM(s) Oral daily  multivitamin 1 Tablet(s) Oral daily  ascorbic acid 500 milliGRAM(s) Oral two times a day  pregabalin 100 milliGRAM(s) Oral two times a day  escitalopram 20 milliGRAM(s) Oral daily  atorvastatin 20 milliGRAM(s) Oral at bedtime  metoprolol succinate ER 25 milliGRAM(s) Oral daily  fluticasone propionate   110 MICROgram(s) HFA Inhaler 1 Puff(s) Inhalation daily  levothyroxine 112 MICROGram(s) Oral every other day  cyanocobalamin 1000 MICROGram(s) Oral daily  ropivacaine 0.2% in 0.9% Sodium Chloride PCRA 200 milliLiter(s) Regional Catheter PCA Continuous  acetaminophen   Tablet. 650 milliGRAM(s) Oral every 6 hours  gabapentin 100 milliGRAM(s) Oral every 8 hours  docusate sodium 100 milliGRAM(s) Oral every 12 hours  senna 2 Tablet(s) Oral at bedtime  levothyroxine 125 MICROGram(s) Oral every other day  enoxaparin Injectable 40 milliGRAM(s) SubCutaneous daily  aspirin  chewable 81 milliGRAM(s) Oral daily  ALBUTerol/ipratropium for Nebulization 3 milliLiter(s) Nebulizer every 6 hours  cefuroxime   Tablet 250 milliGRAM(s) Oral every 12 hours    MEDICATIONS  (PRN):  acetaminophen   Tablet 650 milliGRAM(s) Oral every 6 hours PRN For Temp over 38.3 C (100.94 F)  acetaminophen   Tablet 650 milliGRAM(s) Oral every 6 hours PRN headache  oxyCODONE IR 5 milliGRAM(s) Oral every 4 hours PRN Mild Pain  oxyCODONE IR 10 milliGRAM(s) Oral every 4 hours PRN Moderate Pain  aluminum hydroxide/magnesium hydroxide/simethicone Suspension 30 milliLiter(s) Oral four times a day PRN Indigestion  ondansetron Injectable 4 milliGRAM(s) IV Push every 6 hours PRN Nausea and/or Vomiting  bisacodyl Suppository 10 milliGRAM(s) Rectal daily PRN If no bowel movement by POD#2  benzocaine 15 mG/menthol 3.6 mG Lozenge 1 Lozenge Oral every 4 hours PRN Sore Throat  diphenhydrAMINE   Capsule 25 milliGRAM(s) Oral at bedtime PRN Insomnia  ALPRAZolam 0.5 milliGRAM(s) Oral four times a day PRN anxiety  HYDROmorphone  Injectable 1 milliGRAM(s) IV Push every 4 hours PRN Severe Pain (7 - 10)

## 2017-07-04 NOTE — PROGRESS NOTE ADULT - SUBJECTIVE AND OBJECTIVE BOX
HPI:  Patient is a 76y old  Female who presents with a chief complaint of right hip pain, is now s/p right total hip replacement on  with Dr. Collado. Past medical history significant for CEA 2016 complicated by extensive GI bleed (10/2016) prior due to 4 days of Effient therapy as pre-operative prophylaxis. GI bleeding required 4 day ICU stay, per patient, multiple transfusions. Per patient, upper endo and colonoscopy performed, no cauterization occurred- denies any other surgical intervention. Is seen by gastro Dr. Tan in [facility] and has not had any recurrence of bleed, but is being monitored and still receiving Iron infusions for anemia.   PMH also significant for COPD, CAD, spinal stenosis, and PVD with stents.      Consulted by Dr. Collado for VTE prophylaxis, risk stratification, and anticoagulation management.    PAST MEDICAL & SURGICAL HISTORY:  Frozen shoulder syndrome: right  Antalgic gait  CAD (coronary artery disease)  Anemia  Gallstones  Sciatic pain, right  Lumbar disc disorder  Osteoporosis  COPD (chronic obstructive pulmonary disease)  Neuropathy  Osteoarthritis of hip: right  History of GI bleed: 10/4/2016  Anxiety and depression  PVD (peripheral vascular disease): stents LLE  Dyslipidemia  Hypothyroid  Hypertension  S/P carotid endarterectomy: right; 2017  S/P ovarian cystectomy: with appendectomy;   S/P  section: X2 ,       BMI: 23.7    CrCl: 91.8    Caprini VTE Risk Score: 9 (high)    IMPROVE Bleeding Risk Score: 1.5 (low)     Falls Risk:   High ( x )  Mod (  )  Low (  )    17: Patient seen at bedside. See HPI. Discussed medical history of GI bleed 7 months prior. Spoke with Dr. Canales as well. Patient high thrombosis risk due to surgery, COPD, and age. However due to GI bleed induced by anticoagulant- will choose safest option for VTE. Discussed Lovenox Sq once daily. Dr. Canales in agreement with therapy as it it least likely to cause bleeding, but proven effective post THR VTE prophylaxis.  : Patient seen at bedside while performing bedside dopplers. Fever overnight.  7/3/17:  seen at bedside, requesting rehab  17: Patient seen at bedside- disposition seems very improved. Still with borderline low grade temps.  OOB to chair at this time- discussed Lovenox given her recent history of GIB. She understanding she is high risk for bleed and is aware she should be monitoring her own s/s of bleeding once home as well. For possible rehab at [facility] tomorrow.    FAMILY HISTORY:  Family history of CVA (Sibling): brother; s/p fall;  age 69.  Maternal family history of emphysema (Mother)  Family history of lung cancer (Father): father    Denies any personal or familial history of clotting or bleeding disorders.    Allergies    Cipro (Hives)  clindamycin (Hives)  Flagyl (Hives)  oxycodone (Hives)  Plavix (Hives)  sulfa drugs (Hives)  vancomycin (Hives)    Intolerances    Effient (Other)      REVIEW OF SYSTEMS    (  )Fever	     (  )Constipation	(  )SOB				(  )Headache	(  )Dysuria  (  )Chills	     (  )Melena	(  )Dyspnea present on exertion	                    (  )Dizziness                    (  )Polyuria  (  )Nausea	     (  )Hematochezia	(  )Cough			                    (  )Syncope   	(  )Hematuria  (  )Vomiting    (  )Chest Pain	(  )Wheezing			(  )Weakness  (  )Diarrhea     (  )Palpitations	(  )Anorexia			(  )Myalgia    All other review of systems negative: Yes      PHYSICAL EXAM:    Constitutional: Appears Well    Neurological: A& O x 3    Skin: Warm    Respiratory and Thorax: normal effort; Breath sounds: normal; No rales/wheezing/rhonchi  	  Cardiovascular: S1, S2, regular, NMBR	    Gastrointestinal: BS + x 4Q, nontender	    Genitourinary:  Bladder nondistended, nontender    Musculoskeletal:   General Right:   +muscle/joint tenderness,   normal tone, no joint swelling,   ROM: limited	    General Left:   no muscle/joint tenderness,   normal tone, no joint swelling,   ROM: full    Hip:  Right: Dressing CDI- aquacels x 2      Lower extrems:   Right: no calf tenderness              negative arturo's sign               + pedal pulses    Left:   no calf tenderness              negative arturo's sign               + pedal pulses                           10.1   13.7  )-----------( 264      ( 2017 08:50 )             31.7       07-04    141  |  105  |  10  ----------------------------<  156<H>  3.5   |  29  |  0.54    Ca    8.7      2017 08:50        PT/INR - ( 2017 05:47 )   PT: 10.7 sec;   INR: 0.99 ratio         MEDICATIONS  (STANDING):  sodium chloride 0.9% lock flush 3 milliLiter(s) IV Push every 8 hours  lactated ringers. 1000 milliLiter(s) IV Continuous <Continuous>  pantoprazole    Tablet 40 milliGRAM(s) Oral daily  polyethylene glycol 3350 17 Gram(s) Oral daily  ferrous    sulfate 325 milliGRAM(s) Oral three times a day with meals  folic acid 1 milliGRAM(s) Oral daily  multivitamin 1 Tablet(s) Oral daily  ascorbic acid 500 milliGRAM(s) Oral two times a day  pregabalin 100 milliGRAM(s) Oral two times a day  escitalopram 20 milliGRAM(s) Oral daily  atorvastatin 20 milliGRAM(s) Oral at bedtime  metoprolol succinate ER 25 milliGRAM(s) Oral daily  fluticasone propionate   110 MICROgram(s) HFA Inhaler 1 Puff(s) Inhalation daily  levothyroxine 112 MICROGram(s) Oral every other day  cyanocobalamin 1000 MICROGram(s) Oral daily  ropivacaine 0.2% in 0.9% Sodium Chloride PCRA 200 milliLiter(s) Regional Catheter PCA Continuous  acetaminophen   Tablet. 650 milliGRAM(s) Oral every 6 hours  gabapentin 100 milliGRAM(s) Oral every 8 hours  docusate sodium 100 milliGRAM(s) Oral every 12 hours  senna 2 Tablet(s) Oral at bedtime  levothyroxine 125 MICROGram(s) Oral every other day  enoxaparin Injectable 40 milliGRAM(s) SubCutaneous daily      Vital Signs Last 24 Hrs  T(C): 37.8 (17 @ 06:24), Max: 39.1 (17 @ 20:00)  T(F): 100.1 (17 @ 06:24), Max: 102.4 (17 @ 20:00)  HR: 82 (17 @ 06:24) (66 - 95)  BP: 104/50 (17 @ 06:24) (95/42 - 116/63)  BP(mean): --  RR: 16 (17 @ 06:24) (16 - 16)  SpO2: 94% (07-04-17 @ 06:24) (94% - 99%)      DVT Prophylaxis:  LMWH                   ( x )  Heparin SQ           (  )  Coumadin             (  )  Xarelto                  (  )  Eliquis                   (  )  Venodynes           (x  )  Ambulation          (x  )  UFH                       (  )  Contraindicated  (  )

## 2017-07-04 NOTE — PROGRESS NOTE ADULT - SUBJECTIVE AND OBJECTIVE BOX
HPI:Patient is 76 year old female with past medical history arthritis, hypertension, hypothyroidism, admitted on  for elective right hip replacement to relieve the pain brought on by severe osteoarthritis; she has done well post operatively however over last 48 hours has been having fevers. She notes mild non productive cough, though she is using incentive spirometer, has mild right groin pain, that she thought might be the beginings of an early urinary tract infection. She has no other specific complaints. The drain was removed from her incision today and a new dressing placed.  Today  patient sitting in chair; had low grade temps earlier      MEDICATIONS  (STANDING):  sodium chloride 0.9% lock flush 3 milliLiter(s) IV Push every 8 hours  lactated ringers. 1000 milliLiter(s) (75 mL/Hr) IV Continuous <Continuous>  pantoprazole    Tablet 40 milliGRAM(s) Oral daily  polyethylene glycol 3350 17 Gram(s) Oral daily  ferrous    sulfate 325 milliGRAM(s) Oral three times a day with meals  folic acid 1 milliGRAM(s) Oral daily  multivitamin 1 Tablet(s) Oral daily  ascorbic acid 500 milliGRAM(s) Oral two times a day  pregabalin 100 milliGRAM(s) Oral two times a day  escitalopram 20 milliGRAM(s) Oral daily  atorvastatin 20 milliGRAM(s) Oral at bedtime  metoprolol succinate ER 25 milliGRAM(s) Oral daily  fluticasone propionate   110 MICROgram(s) HFA Inhaler 1 Puff(s) Inhalation daily  levothyroxine 112 MICROGram(s) Oral every other day  cyanocobalamin 1000 MICROGram(s) Oral daily  ropivacaine 0.2% in 0.9% Sodium Chloride PCRA 200 milliLiter(s) Regional Catheter PCA Continuous  acetaminophen   Tablet. 650 milliGRAM(s) Oral every 6 hours  gabapentin 100 milliGRAM(s) Oral every 8 hours  docusate sodium 100 milliGRAM(s) Oral every 12 hours  senna 2 Tablet(s) Oral at bedtime  levothyroxine 125 MICROGram(s) Oral every other day  enoxaparin Injectable 40 milliGRAM(s) SubCutaneous daily  aspirin  chewable 81 milliGRAM(s) Oral daily  ALBUTerol/ipratropium for Nebulization 3 milliLiter(s) Nebulizer every 6 hours  cefuroxime   Tablet 250 milliGRAM(s) Oral every 12 hours    MEDICATIONS  (PRN):  acetaminophen   Tablet 650 milliGRAM(s) Oral every 6 hours PRN For Temp over 38.3 C (100.94 F)  acetaminophen   Tablet 650 milliGRAM(s) Oral every 6 hours PRN headache  oxyCODONE IR 5 milliGRAM(s) Oral every 4 hours PRN Mild Pain  oxyCODONE IR 10 milliGRAM(s) Oral every 4 hours PRN Moderate Pain  aluminum hydroxide/magnesium hydroxide/simethicone Suspension 30 milliLiter(s) Oral four times a day PRN Indigestion  ondansetron Injectable 4 milliGRAM(s) IV Push every 6 hours PRN Nausea and/or Vomiting  bisacodyl Suppository 10 milliGRAM(s) Rectal daily PRN If no bowel movement by POD#2  benzocaine 15 mG/menthol 3.6 mG Lozenge 1 Lozenge Oral every 4 hours PRN Sore Throat  diphenhydrAMINE   Capsule 25 milliGRAM(s) Oral at bedtime PRN Insomnia  ALPRAZolam 0.5 milliGRAM(s) Oral four times a day PRN anxiety  HYDROmorphone  Injectable 1 milliGRAM(s) IV Push every 4 hours PRN Severe Pain (7 - 10)      Vital Signs Last 24 Hrs  T(C): 37.8 (2017 06:24), Max: 39.1 (2017 20:00)  T(F): 100.1 (2017 06:24), Max: 102.4 (2017 20:00)  HR: 82 (2017 06:24) (66 - 95)  BP: 104/50 (2017 06:24) (95/42 - 116/63)  BP(mean): --  RR: 16 (2017 06:24) (16 - 16)  SpO2: 94% (2017 06:24) (94% - 99%)    Physical Exam:          Daily   Constitutional: frail looking  HEENT: NC/AT, EOMI, PERRLA  Neck: supple  Respiratory: right basilar wheeze  Cardiovascular: S1S2 regular, no murmurs  Abdomen: soft, not tender, not distended, positive BS  Genitourinary: deferred  Rectal: deferred  Musculoskeletal:right hip with dressing in tact  Neurological: AxOx3  Skin: no rashes                          9.6    12.7  )-----------( 182      ( 2017 06:12 )             29.4         141  |  106  |  13  ----------------------------<  103<H>  4.2   |  29  |  0.55    Ca    8.5      2017 06:12         Urinalysis Basic - ( 2017 05:40 )    Color: Yellow / Appearance: Clear / S.010 / pH: x  Gluc: x / Ketone: Negative  / Bili: Negative / Urobili: Negative mg/dL   Blood: x / Protein: Negative mg/dL / Nitrite: Negative   Leuk Esterase: Negative / RBC: x / WBC x   Sq Epi: x / Non Sq Epi: x / Bacteria: x      Culture - Blood (17 @ 09:51)    Specimen Source: .Blood None    Culture Results:   No growth to date.    Culture - Blood (17 @ 09:44)    Specimen Source: .Blood None    Culture Results:   No growth to date.                    Radiology:< from: Xray Chest 1 View AP/PA. (17 @ 09:50) >    EXAM:  CHEST SINGLE VIEW FRONTAL                            PROCEDURE DATE:  2017        INTERPRETATION:  Exam Date: 2017 9:50 AM    History: Fever    Technique: Single frontal portable view of the chest with comparison to    2017    Findings:    The heart is mildly prominent.  The lungs are grossly clear. The apices   and hemidiaphragms are unremarkable. Degenerative changes of the   visualized osseous structures.        Impression:    No acute disease     No significant interval change as compared to  2017      < end of copied text >      Advanced directive addressed: full resuscitation

## 2017-07-04 NOTE — PROGRESS NOTE ADULT - SUBJECTIVE AND OBJECTIVE BOX
Patient S&E at bedside. Pain controlled. Pt feels fatigued.     Tm 102, otherwise AVSS    Gen: NAD  Right Lower Extremity:  Dsg c/d/i  SILT L2-S1  +EHL/FHL/TA/Gastroc  DP+  Soft compartments, - calf ttp    Assessment and Plan:     A/P: 76F s/p R FABIANO POD 4    F/U Medicine/ID re temperature  abx per ID  Analgesia   DVT ppx  WBAT - posterior hip precautions  PT/OT  D/C planning - ortho stable for discharge

## 2017-07-05 VITALS — OXYGEN SATURATION: 99 %

## 2017-07-05 LAB
ANION GAP SERPL CALC-SCNC: 8 MMOL/L — SIGNIFICANT CHANGE UP (ref 5–17)
BUN SERPL-MCNC: 14 MG/DL — SIGNIFICANT CHANGE UP (ref 7–23)
CALCIUM SERPL-MCNC: 8.4 MG/DL — LOW (ref 8.5–10.1)
CHLORIDE SERPL-SCNC: 102 MMOL/L — SIGNIFICANT CHANGE UP (ref 96–108)
CO2 SERPL-SCNC: 30 MMOL/L — SIGNIFICANT CHANGE UP (ref 22–31)
CREAT SERPL-MCNC: 0.55 MG/DL — SIGNIFICANT CHANGE UP (ref 0.5–1.3)
GLUCOSE SERPL-MCNC: 96 MG/DL — SIGNIFICANT CHANGE UP (ref 70–99)
HCT VFR BLD CALC: 29 % — LOW (ref 34.5–45)
HGB BLD-MCNC: 9.3 G/DL — LOW (ref 11.5–15.5)
MCHC RBC-ENTMCNC: 30.3 PG — SIGNIFICANT CHANGE UP (ref 27–34)
MCHC RBC-ENTMCNC: 32.1 GM/DL — SIGNIFICANT CHANGE UP (ref 32–36)
MCV RBC AUTO: 94.3 FL — SIGNIFICANT CHANGE UP (ref 80–100)
PLATELET # BLD AUTO: 255 K/UL — SIGNIFICANT CHANGE UP (ref 150–400)
POTASSIUM SERPL-MCNC: 4.1 MMOL/L — SIGNIFICANT CHANGE UP (ref 3.5–5.3)
POTASSIUM SERPL-SCNC: 4.1 MMOL/L — SIGNIFICANT CHANGE UP (ref 3.5–5.3)
RBC # BLD: 3.08 M/UL — LOW (ref 3.8–5.2)
RBC # FLD: 19.1 % — HIGH (ref 10.3–14.5)
SODIUM SERPL-SCNC: 140 MMOL/L — SIGNIFICANT CHANGE UP (ref 135–145)
SURGICAL PATHOLOGY FINAL REPORT - CH: SIGNIFICANT CHANGE UP
WBC # BLD: 10.6 K/UL — HIGH (ref 3.8–10.5)
WBC # FLD AUTO: 10.6 K/UL — HIGH (ref 3.8–10.5)

## 2017-07-05 PROCEDURE — 99233 SBSQ HOSP IP/OBS HIGH 50: CPT

## 2017-07-05 RX ORDER — DOCUSATE SODIUM 100 MG
1 CAPSULE ORAL
Qty: 0 | Refills: 0 | COMMUNITY
Start: 2017-07-05

## 2017-07-05 RX ORDER — ENOXAPARIN SODIUM 100 MG/ML
40 INJECTION SUBCUTANEOUS
Qty: 28 | Refills: 0 | OUTPATIENT
Start: 2017-07-05 | End: 2017-08-02

## 2017-07-05 RX ORDER — ACETAMINOPHEN 500 MG
2 TABLET ORAL
Qty: 0 | Refills: 0 | COMMUNITY
Start: 2017-07-05

## 2017-07-05 RX ORDER — POLYETHYLENE GLYCOL 3350 17 G/17G
17 POWDER, FOR SOLUTION ORAL
Qty: 0 | Refills: 0 | COMMUNITY
Start: 2017-07-05

## 2017-07-05 RX ORDER — CEFUROXIME AXETIL 250 MG
1 TABLET ORAL
Qty: 0 | Refills: 0 | COMMUNITY
Start: 2017-07-05 | End: 2017-07-07

## 2017-07-05 RX ORDER — SENNA PLUS 8.6 MG/1
2 TABLET ORAL
Qty: 0 | Refills: 0 | COMMUNITY
Start: 2017-07-05

## 2017-07-05 RX ADMIN — Medication 0.5 MILLIGRAM(S): at 11:35

## 2017-07-05 RX ADMIN — PANTOPRAZOLE SODIUM 40 MILLIGRAM(S): 20 TABLET, DELAYED RELEASE ORAL at 11:35

## 2017-07-05 RX ADMIN — SODIUM CHLORIDE 3 MILLILITER(S): 9 INJECTION INTRAMUSCULAR; INTRAVENOUS; SUBCUTANEOUS at 05:40

## 2017-07-05 RX ADMIN — Medication 125 MICROGRAM(S): at 11:35

## 2017-07-05 RX ADMIN — PREGABALIN 1000 MICROGRAM(S): 225 CAPSULE ORAL at 11:35

## 2017-07-05 RX ADMIN — Medication 250 MILLIGRAM(S): at 05:50

## 2017-07-05 RX ADMIN — Medication 100 MILLIGRAM(S): at 05:54

## 2017-07-05 RX ADMIN — Medication 3 MILLILITER(S): at 08:25

## 2017-07-05 RX ADMIN — Medication 325 MILLIGRAM(S): at 11:35

## 2017-07-05 RX ADMIN — GABAPENTIN 100 MILLIGRAM(S): 400 CAPSULE ORAL at 05:50

## 2017-07-05 RX ADMIN — OXYCODONE HYDROCHLORIDE 10 MILLIGRAM(S): 5 TABLET ORAL at 13:49

## 2017-07-05 RX ADMIN — Medication 100 MILLIGRAM(S): at 05:50

## 2017-07-05 RX ADMIN — Medication 500 MILLIGRAM(S): at 05:50

## 2017-07-05 RX ADMIN — POLYETHYLENE GLYCOL 3350 17 GRAM(S): 17 POWDER, FOR SOLUTION ORAL at 11:35

## 2017-07-05 RX ADMIN — ESCITALOPRAM OXALATE 20 MILLIGRAM(S): 10 TABLET, FILM COATED ORAL at 11:35

## 2017-07-05 RX ADMIN — Medication 1 MILLIGRAM(S): at 11:35

## 2017-07-05 RX ADMIN — OXYCODONE HYDROCHLORIDE 10 MILLIGRAM(S): 5 TABLET ORAL at 09:28

## 2017-07-05 RX ADMIN — Medication 81 MILLIGRAM(S): at 11:35

## 2017-07-05 RX ADMIN — Medication 25 MILLIGRAM(S): at 05:50

## 2017-07-05 RX ADMIN — Medication 1 TABLET(S): at 11:35

## 2017-07-05 RX ADMIN — ENOXAPARIN SODIUM 40 MILLIGRAM(S): 100 INJECTION SUBCUTANEOUS at 11:35

## 2017-07-05 NOTE — PROGRESS NOTE ADULT - ASSESSMENT
76F s/p R FABIANO POD 0  Analgesia  DVT ppx  Incentive spirometry  WBAT  P/T  Discharge planning
:Patient is 76 year old female with past medical history arthritis, hypertension, hypothyroidism, admitted on 6/30 for elective right hip replacement to relieve the pain brought on by severe osteoarthritis; she has done well post operatively however over last 48 hours has been having fevers. She notes mild non productive cough, though she is using incentive spirometer, has mild right groin pain, that she thought might be the beginings of an early urinary tract infection. She has no other specific complaints. The drain was removed from her incision today and a new dressing placed.  1. Post op fever in this patient admitted for elective right hip replacement  -given wheezing at right lung base this may represent atelectasis and early bronchitis as source of fever, also am concerned about possible early urinary tract infection given patient complaints  - encouraged incentive spirometry  - received ceftriaxone  -will continue antibiotics with a short course of ceftin 250 mg po q 12 hours  -day #2 ceftin  - tolerating antibiotics without rashes or side effects   -wound care per surgery  2. other issues; hypertension, hypothyroidism  - per medicine
:Patient is 76 year old female with past medical history arthritis, hypertension, hypothyroidism, admitted on 6/30 for elective right hip replacement to relieve the pain brought on by severe osteoarthritis; she has done well post operatively however over last 48 hours has been having fevers. She notes mild non productive cough, though she is using incentive spirometer, has mild right groin pain, that she thought might be the beginings of an early urinary tract infection. She has no other specific complaints. The drain was removed from her incision today and a new dressing placed.  1. Post op fever in this patient admitted for elective right hip replacement  -given wheezing at right lung base this may represent atelectasis and early bronchitis as source of fever, also am concerned about possible early urinary tract infection given patient complaints  - encouraged incentive spirometry  - received ceftriaxone  -will continue antibiotics with a short course of ceftin 250 mg po q 12 hours  -day #3 ceftin  - tolerating antibiotics without rashes or side effects   -wound care per surgery  -okay from id standpoint to discharge home on total 5 days po ceftin 250 mg q 12 hours  -most likely fever secondary to low grade hematoma in hip  2. other issues; hypertension, hypothyroidism  - per medicine
A/P: 76F s/p R FABIANO POD 1  Analgesia  DVT ppx  WBAT  PT/OT  DC planning
Pt is a 75 y/o female with h/o anemia, CAD, osteoporosis, COPD, neuropathy, chronic LBP, PVD s/p LLE stent, hyperlipidemia, hypothyroidism, osteoarthritis who has been having increasing right hip pain which has affected her ADLS and quality of life.  She was having difficulty going up stairs, getting dressed therefore she was admitted for elective Rt total hip arthroplasty.  Post-op medicine consult called for comanagement.     * Rt Hip Osteoarthritis-s/p Rt FABIANO, continue pain control, PT, encourage use of incentive spirometry, DVT proph.  * Acute Blood Loss Anemia-surgical loss, monitor, continue po iron.  * CAD-stable, aspirin, metoprol based on parameters, statin  * Hypothyroidism-continue synthroid  *  POSTPROCEDURAL FEVER-? atelectasis, clinically no obvious pathology noted ?POSSIBLE EARLY BRONCHITIS PER ID -- ON CEFTIN  Since pt is non-toxic, monitor for now, CXR negative, dopplers negative.  ID eval, monitor for now, f/u blood cults.  * Anxiety- Lexapro alprazolam  * ANEMIA - ACUTE BLOOD LOSS; H/H STABLE, MONITOR  Proph- Lovenox  * Tobacco Use-smoking cessation, reviewed with pt.  Add duonebs for wheezing, she likely has underlying COPD.  * LEUKOCYTOSIS - STRESS RESPONSE; STABLE. POSSIBLY FROM INFECTION DUE TO FEVERS - MONITOR   Disp-OOB,  D/W RN, PT  * Comm- d/w pt, RN
This is a 76 year old female s/p right total hip replacement on 6/30 with Dr. Collado, who is high risk for thrombosis due to age, surgery, immobility, and COPD; high risk for bleeding due to recent GI bleed from oral anticoagulant. After discussion with primary team- recommend Lovenox 40 mg SQ daily as safest option for  VTE prophylaxis for this patient.    Plan:  :: Lovenox 40mg SQ daily today and continue x 30 days  ::Monitor for s/s of bleeding closely  ::Daily CBC/BMP  ::B/l venodynes  ::Enc ambulation per PT    Will continue to follow.
This is a 76 year old female s/p right total hip replacement on 6/30 with Dr. Collado, who is high risk for thrombosis due to age, surgery, immobility, and COPD; high risk for bleeding due to recent GI bleed from oral anticoagulant. After discussion with primary team- recommend Lovenox 40 mg SQ daily as safest option for  VTE prophylaxis for this patient.    Plan:  :: Lovenox 40mg SQ daily today and continue x 30 days  ::Monitor for s/s of bleeding closely  ::Daily CBC/BMP  ::B/l venodynes  ::Enc ambulation per PT  prob rehab    Will continue to follow.
This is a 76 year old female s/p right total hip replacement on 6/30 with Dr. Collado, who is high risk for thrombosis due to age, surgery, immobility, and COPD; high risk for bleeding due to recent GI bleed from oral anticoagulant. After discussion with primary team- recommend Lovenox 40 mg SQ daily as safest option for  VTE prophylaxis for this patient.  7/4: For possible rehab placement tomorrow at [facility]- still with low grade temps.  Plan:  :: Lovenox 40mg SQ daily today and continue x 30 days  :: Monitor for s/s of bleeding closely  :: Daily CBC/BMP  :: B/l venodynes  :: Enc ambulation per PT      Will continue to follow.
This is a 76 year old female s/p right total hip replacement on 6/30 with Dr. Collado, who is high risk for thrombosis due to age, surgery, immobility, and COPD; high risk for bleeding due to recent GI bleed from oral anticoagulant. After discussion with primary team- recommend Lovenox 40 mg SQ daily as safest option for  VTE prophylaxis for this patient.  7/4: For possible rehab placement tomorrow at \[facility]\""- still with low grade temps.  Plan:  :: Lovenox 40mg SQ daily today and continue x 30 days  :: Monitor for s/s of bleeding closely  :: Daily CBC/BMP  :: B/l venodynes  :: Enc ambulation per PT      Will continue to follow.
Pt is a 75 y/o female with h/o anemia, CAD, osteoporosis, COPD, neuropathy, chronic LBP, PVD s/p LLE stent, hyperlipidemia, hypothyroidism, osteoarthritis who has been having increasing right hip pain which has affected her ADLS and quality of life.  She was having difficulty going up stairs, getting dressed therefore she was admitted for elective Rt total hip arthroplasty.  Post-op medicine consult called for comanagement.     * Rt Hip Osteoarthritis-s/p Rt FABIANO, continue pain control, PT, monitor post-op labs, encourage use of incentive spirometry, DVT proph.  * Acute Blood Loss Anemia-surgical loss, monitor, continue po iron.  * CAD-stable, resume baby aspirin since she is on Lovenox now, continue metoprolol based on parameters, statin  * Hypothyroidism-continue synthroid  * Hypotension-resolved however bp low normal, monitor for now.  * Fever-likely atelectasis, encourage use of incentive spirometry, agree with u/a, cxr, dopplers, monitor for now since she is non-toxic.  * Anxiety- Lexapro alprazolam  * Proph- lovenox  * Tobacco Use-smoking cessation   * Disp-OOB, PT  * Comm-d/w pt, RN
Pt is a 77 y/o female with h/o anemia, CAD, osteoporosis, COPD, neuropathy, chronic LBP, PVD s/p LLE stent, hyperlipidemia, hypothyroidism, osteoarthritis who has been having increasing right hip pain which has affected her ADLS and quality of life.  She was having difficulty going up stairs, getting dressed therefore she was admitted for elective Rt total hip arthroplasty.  Post-op medicine consult called for comanagement.     * Rt Hip Osteoarthritis-s/p Rt FABIANO, continue pain control, PT, encourage use of incentive spirometry, DVT proph.  * Acute Blood Loss Anemia-surgical loss, monitor, continue po iron.  * CAD-stable, aspirin, metoprol based on parameters, statin  * Hypothyroidism-continue synthroid  * Fever-? atelectasis, clinically no obvious pathology noted ? due to possible Rt thigh internal hematoma.  Since pt is non-toxic, monitor for now, CXR negative, u/a negative, dopplers negative.  ID eval, monitor for now, f/u blood cults.  * Anxiety- Lexapro alprazolam  * Proph- Lovenox  * Tobacco Use-smoking cessation, reviewed with pt.  Add duonebs for wheezing, she likely has underlying COPD.  * Disp-OOB, PT, ortho resident, Dr Chin  * Comm- d/w pt, RN
Pt is a 77 y/o female with h/o anemia, CAD, osteoporosis, COPD, neuropathy, chronic LBP, PVD s/p LLE stent, hyperlipidemia, hypothyroidism, osteoarthritis who has been having increasing right hip pain which has affected her ADLS and quality of life.  She was having difficulty going up stairs, getting dressed therefore she was admitted for elective Rt total hip arthroplasty.  Post-op medicine consult called for comanagement.     * Rt Hip Osteoarthritis-s/p Rt FABIANO, continue pain control, PT, encourage use of incentive spirometry, DVT proph.  * Acute Blood Loss Anemia-surgical loss, monitor, continue po iron.  * CAD-stable, aspirin, metoprol based on parameters, statin  * Hypothyroidism-continue synthroid  * Fever-? bronchitis ? early UTI, overall improved, cults and cxr negative.  Okay for d/c on po ceftin for 2 more days per Dr Chin.   * Anxiety- Lexapro alprazolam  * Proph- Lovenox  * Tobacco Use-smoking cessation, BD  * Disp-OOB, PT, d/c planning for rehab, medically stable for d/c.  * Comm- d/w pt, RN, Dr Chin, , Matthew.

## 2017-07-05 NOTE — PROGRESS NOTE ADULT - RS GEN PE MLT RESP DETAILS PC
clear to auscultation bilaterally/breath sounds equal
wheezes/breath sounds equal/diminished breath sounds, L/diminished breath sounds, R
breath sounds equal/wheezes

## 2017-07-05 NOTE — PROGRESS NOTE ADULT - SUBJECTIVE AND OBJECTIVE BOX
HPI:  Patient is a 76y old  Female who presents with a chief complaint of right hip pain, is now s/p right total hip replacement on  with Dr. Collado. Past medical history significant for CEA 2016 complicated by extensive GI bleed (10/2016) prior due to 4 days of Effient therapy as pre-operative prophylaxis. GI bleeding required 4 day ICU stay, per patient, multiple transfusions. Per patient, upper endo and colonoscopy performed, no cauterization occurred- denies any other surgical intervention. Is seen by gastro Dr. Tan in [facility] and has not had any recurrence of bleed, but is being monitored and still receiving Iron infusions for anemia.   PMH also significant for COPD, CAD, spinal stenosis, and PVD with stents.      Consulted by Dr. Collado for VTE prophylaxis, risk stratification, and anticoagulation management.    PAST MEDICAL & SURGICAL HISTORY:  Frozen shoulder syndrome: right  Antalgic gait  CAD (coronary artery disease)  Anemia  Gallstones  Sciatic pain, right  Lumbar disc disorder  Osteoporosis  COPD (chronic obstructive pulmonary disease)  Neuropathy  Osteoarthritis of hip: right  History of GI bleed: 10/4/2016  Anxiety and depression  PVD (peripheral vascular disease): stents LLE  Dyslipidemia  Hypothyroid  Hypertension  S/P carotid endarterectomy: right; 2017  S/P ovarian cystectomy: with appendectomy;   S/P  section: X2 ,       BMI: 23.7    CrCl: 91.8    Caprini VTE Risk Score: 9 (high)    IMPROVE Bleeding Risk Score: 1.5 (low)     Falls Risk:   High ( x )  Mod (  )  Low (  )    17: Patient seen at bedside. See HPI. Discussed medical history of GI bleed 7 months prior. Spoke with Dr. Canales as well. Patient high thrombosis risk due to surgery, COPD, and age. However due to GI bleed induced by anticoagulant- will choose safest option for VTE. Discussed Lovenox Sq once daily. Dr. Canales in agreement with therapy as it it least likely to cause bleeding, but proven effective post THR VTE prophylaxis.  : Patient seen at bedside while performing bedside dopplers. Fever overnight.  7/3/17:  seen at bedside, requesting rehab  17: Patient seen at bedside- disposition seems very improved. Still with borderline low grade temps.  OOB to chair at this time- discussed Lovenox given her recent history of GIB. She understanding she is high risk for bleed and is aware she should be monitoring her own s/s of bleeding once home as well. For possible rehab at [facility] tomorrow.  : Patient to go to rehab today- [facility].    FAMILY HISTORY:  Family history of CVA (Sibling): brother; s/p fall;  age 69.  Maternal family history of emphysema (Mother)  Family history of lung cancer (Father): father    Denies any personal or familial history of clotting or bleeding disorders.    Allergies    Cipro (Hives)  clindamycin (Hives)  Flagyl (Hives)  oxycodone (Hives)  Plavix (Hives)  sulfa drugs (Hives)  vancomycin (Hives)    Intolerances    Effient (Other)      REVIEW OF SYSTEMS    (  )Fever	     (  )Constipation	(  )SOB				(  )Headache	(  )Dysuria  (  )Chills	     (  )Melena	(  )Dyspnea present on exertion	                    (  )Dizziness                    (  )Polyuria  (  )Nausea	     (  )Hematochezia	(  )Cough			                    (  )Syncope   	(  )Hematuria  (  )Vomiting    (  )Chest Pain	(  )Wheezing			(  )Weakness  (  )Diarrhea     (  )Palpitations	(  )Anorexia			(  )Myalgia    All other review of systems negative: Yes      PHYSICAL EXAM:    Constitutional: Appears Well    Neurological: A& O x 3    Skin: Warm    Respiratory and Thorax: normal effort; Breath sounds: normal; No rales/wheezing/rhonchi  	  Cardiovascular: S1, S2, regular, NMBR	    Gastrointestinal: BS + x 4Q, nontender	    Genitourinary:  Bladder nondistended, nontender    Musculoskeletal:   General Right:   +muscle/joint tenderness,   normal tone, no joint swelling,   ROM: limited	    General Left:   no muscle/joint tenderness,   normal tone, no joint swelling,   ROM: full    Hip:  Right: Dressing CDI- aquacels x 2      Lower extrems:   Right: no calf tenderness              negative arturo's sign               + pedal pulses    Left:   no calf tenderness              negative arturo's sign               + pedal pulses                            9.3    10.6  )-----------( 255      ( 2017 06:00 )             29.0       07-05    140  |  102  |  14  ----------------------------<  96  4.1   |  30  |  0.55    Ca    8.4<L>      2017 06:00        PT/INR - ( 2017 05:47 )   PT: 10.7 sec;   INR: 0.99 ratio          MEDICATIONS  (STANDING):  sodium chloride 0.9% lock flush 3 milliLiter(s) IV Push every 8 hours  lactated ringers. 1000 milliLiter(s) IV Continuous <Continuous>  pantoprazole    Tablet 40 milliGRAM(s) Oral daily  polyethylene glycol 3350 17 Gram(s) Oral daily  ferrous    sulfate 325 milliGRAM(s) Oral three times a day with meals  folic acid 1 milliGRAM(s) Oral daily  multivitamin 1 Tablet(s) Oral daily  ascorbic acid 500 milliGRAM(s) Oral two times a day  pregabalin 100 milliGRAM(s) Oral two times a day  escitalopram 20 milliGRAM(s) Oral daily  atorvastatin 20 milliGRAM(s) Oral at bedtime  metoprolol succinate ER 25 milliGRAM(s) Oral daily  fluticasone propionate   110 MICROgram(s) HFA Inhaler 1 Puff(s) Inhalation daily  levothyroxine 112 MICROGram(s) Oral every other day  cyanocobalamin 1000 MICROGram(s) Oral daily  ropivacaine 0.2% in 0.9% Sodium Chloride PCRA 200 milliLiter(s) Regional Catheter PCA Continuous  acetaminophen   Tablet. 650 milliGRAM(s) Oral every 6 hours  gabapentin 100 milliGRAM(s) Oral every 8 hours  docusate sodium 100 milliGRAM(s) Oral every 12 hours  senna 2 Tablet(s) Oral at bedtime  levothyroxine 125 MICROGram(s) Oral every other day  enoxaparin Injectable 40 milliGRAM(s) SubCutaneous daily      Vital Signs Last 24 Hrs  T(C): 37.4 (17 @ 07:25), Max: 38 (17 @ 21:50)  T(F): 99.3 (17 @ 07:25), Max: 100.4 (17 @ 21:50)  HR: 90 (17 @ 07:25) (74 - 90)  BP: 118/54 (17 @ 05:46) (111/55 - 118/54)  BP(mean): --  RR: 16 (17 @ 07:25) (16 - 16)  SpO2: 99% (17 @ 09:34) (90% - 100%)    DVT Prophylaxis:  LMWH                   ( x )  Heparin SQ           (  )  Coumadin             (  )  Xarelto                  (  )  Eliquis                   (  )  Venodynes           (x  )  Ambulation          (x  )  UFH                       (  )  Contraindicated  (  )

## 2017-07-05 NOTE — PROGRESS NOTE ADULT - GASTROINTESTINAL DETAILS
bowel sounds normal/no distention/nontender/soft
no distention/soft/nontender/bowel sounds normal
no distention/bowel sounds normal/nontender/soft

## 2017-07-05 NOTE — PROGRESS NOTE ADULT - SUBJECTIVE AND OBJECTIVE BOX
HPI:Patient is 76 year old female with past medical history arthritis, hypertension, hypothyroidism, admitted on  for elective right hip replacement to relieve the pain brought on by severe osteoarthritis; she has done well post operatively however over last 48 hours has been having fevers. She notes mild non productive cough, though she is using incentive spirometer, has mild right groin pain, that she thought might be the beginings of an early urinary tract infection. She has no other specific complaints. The drain was removed from her incision today and a new dressing placed.  Today  patient sitting in chair; had low grade temps earlier  Today  patient without complaints, still with low grade fever      MEDICATIONS  (STANDING):  sodium chloride 0.9% lock flush 3 milliLiter(s) IV Push every 8 hours  lactated ringers. 1000 milliLiter(s) (75 mL/Hr) IV Continuous <Continuous>  pantoprazole    Tablet 40 milliGRAM(s) Oral daily  polyethylene glycol 3350 17 Gram(s) Oral daily  ferrous    sulfate 325 milliGRAM(s) Oral three times a day with meals  folic acid 1 milliGRAM(s) Oral daily  multivitamin 1 Tablet(s) Oral daily  ascorbic acid 500 milliGRAM(s) Oral two times a day  pregabalin 100 milliGRAM(s) Oral two times a day  escitalopram 20 milliGRAM(s) Oral daily  atorvastatin 20 milliGRAM(s) Oral at bedtime  metoprolol succinate ER 25 milliGRAM(s) Oral daily  fluticasone propionate   110 MICROgram(s) HFA Inhaler 1 Puff(s) Inhalation daily  levothyroxine 112 MICROGram(s) Oral every other day  cyanocobalamin 1000 MICROGram(s) Oral daily  ropivacaine 0.2% in 0.9% Sodium Chloride PCRA 200 milliLiter(s) Regional Catheter PCA Continuous  gabapentin 100 milliGRAM(s) Oral every 8 hours  docusate sodium 100 milliGRAM(s) Oral every 12 hours  senna 2 Tablet(s) Oral at bedtime  levothyroxine 125 MICROGram(s) Oral every other day  enoxaparin Injectable 40 milliGRAM(s) SubCutaneous daily  aspirin  chewable 81 milliGRAM(s) Oral daily  ALBUTerol/ipratropium for Nebulization 3 milliLiter(s) Nebulizer every 6 hours  cefuroxime   Tablet 250 milliGRAM(s) Oral every 12 hours    MEDICATIONS  (PRN):  acetaminophen   Tablet 650 milliGRAM(s) Oral every 6 hours PRN For Temp over 38.3 C (100.94 F)  acetaminophen   Tablet 650 milliGRAM(s) Oral every 6 hours PRN headache  oxyCODONE IR 5 milliGRAM(s) Oral every 4 hours PRN Mild Pain  oxyCODONE IR 10 milliGRAM(s) Oral every 4 hours PRN Moderate Pain  aluminum hydroxide/magnesium hydroxide/simethicone Suspension 30 milliLiter(s) Oral four times a day PRN Indigestion  ondansetron Injectable 4 milliGRAM(s) IV Push every 6 hours PRN Nausea and/or Vomiting  bisacodyl Suppository 10 milliGRAM(s) Rectal daily PRN If no bowel movement by POD#2  benzocaine 15 mG/menthol 3.6 mG Lozenge 1 Lozenge Oral every 4 hours PRN Sore Throat  diphenhydrAMINE   Capsule 25 milliGRAM(s) Oral at bedtime PRN Insomnia  ALPRAZolam 0.5 milliGRAM(s) Oral four times a day PRN anxiety  HYDROmorphone  Injectable 1 milliGRAM(s) IV Push every 4 hours PRN Severe Pain (7 - 10)      Vital Signs Last 24 Hrs  T(C): 37.4 (2017 07:25), Max: 38 (2017 21:50)  T(F): 99.3 (2017 07:25), Max: 100.4 (2017 21:50)  HR: 90 (2017 07:25) (74 - 90)  BP: 118/54 (2017 05:46) (111/55 - 118/54)  BP(mean): --  RR: 16 (2017 07:25) (16 - 16)  SpO2: 99% (2017 09:34) (90% - 100%)    Physical Exam:          Daily   Constitutional: frail looking  HEENT: NC/AT, EOMI, PERRLA  Neck: supple  Respiratory: right basilar wheeze  Cardiovascular: S1S2 regular, no murmurs  Abdomen: soft, not tender, not distended, positive BS  Genitourinary: deferred  Rectal: deferred  Musculoskeletal:right hip with dressing in tact  Neurological: AxOx3  Skin: no rashes                          9.6    12.7  )-----------( 182      ( 2017 06:12 )             29.4     07-    141  |  106  |  13  ----------------------------<  103<H>  4.2   |  29  |  0.55    Ca    8.5      2017 06:12         Urinalysis Basic - ( 2017 05:40 )    Color: Yellow / Appearance: Clear / S.010 / pH: x  Gluc: x / Ketone: Negative  / Bili: Negative / Urobili: Negative mg/dL   Blood: x / Protein: Negative mg/dL / Nitrite: Negative   Leuk Esterase: Negative / RBC: x / WBC x   Sq Epi: x / Non Sq Epi: x / Bacteria: x      Culture - Blood (17 @ 09:51)    Specimen Source: .Blood None    Culture Results:   No growth to date.    Culture - Blood (17 @ 09:44)    Specimen Source: .Blood None    Culture Results:   No growth to date.                    Radiology:< from: Xray Chest 1 View AP/PA. (17 @ 09:50) >    EXAM:  CHEST SINGLE VIEW FRONTAL                            PROCEDURE DATE:  2017        INTERPRETATION:  Exam Date: 2017 9:50 AM    History: Fever    Technique: Single frontal portable view of the chest with comparison to    2017    Findings:    The heart is mildly prominent.  The lungs are grossly clear. The apices   and hemidiaphragms are unremarkable. Degenerative changes of the   visualized osseous structures.        Impression:    No acute disease     No significant interval change as compared to  2017      < end of copied text >      Advanced directive addressed: full resuscitation

## 2017-07-05 NOTE — PROGRESS NOTE ADULT - SUBJECTIVE AND OBJECTIVE BOX
Pt looks and feels well, c/o mild Rt hip pain.  No CP or SOB.  Still with low grade fevers.    Vital Signs Last 24 Hrs  T(C): 37.4 (05 Jul 2017 07:25), Max: 38 (04 Jul 2017 21:50)  T(F): 99.3 (05 Jul 2017 07:25), Max: 100.4 (04 Jul 2017 21:50)  HR: 90 (05 Jul 2017 07:25) (74 - 90)  BP: 118/54 (05 Jul 2017 05:46) (111/55 - 118/54)  BP(mean): --  RR: 16 (05 Jul 2017 07:25) (16 - 16)  SpO2: 90% (05 Jul 2017 07:25) (90% - 100%)    Daily     Daily     I&O's Detail      CAPILLARY BLOOD GLUCOSE                                          9.3    10.6  )-----------( 255      ( 05 Jul 2017 06:00 )             29.0       07-05    140  |  102  |  14  ----------------------------<  96  4.1   |  30  |  0.55    Ca    8.4<L>      05 Jul 2017 06:00        PT/INR - ( 04 Jul 2017 05:47 )   PT: 10.7 sec;   INR: 0.99 ratio                         MEDICATIONS  (STANDING):  sodium chloride 0.9% lock flush 3 milliLiter(s) IV Push every 8 hours  lactated ringers. 1000 milliLiter(s) (75 mL/Hr) IV Continuous <Continuous>  pantoprazole    Tablet 40 milliGRAM(s) Oral daily  polyethylene glycol 3350 17 Gram(s) Oral daily  ferrous    sulfate 325 milliGRAM(s) Oral three times a day with meals  folic acid 1 milliGRAM(s) Oral daily  multivitamin 1 Tablet(s) Oral daily  ascorbic acid 500 milliGRAM(s) Oral two times a day  pregabalin 100 milliGRAM(s) Oral two times a day  escitalopram 20 milliGRAM(s) Oral daily  atorvastatin 20 milliGRAM(s) Oral at bedtime  metoprolol succinate ER 25 milliGRAM(s) Oral daily  fluticasone propionate   110 MICROgram(s) HFA Inhaler 1 Puff(s) Inhalation daily  levothyroxine 112 MICROGram(s) Oral every other day  cyanocobalamin 1000 MICROGram(s) Oral daily  ropivacaine 0.2% in 0.9% Sodium Chloride PCRA 200 milliLiter(s) Regional Catheter PCA Continuous  gabapentin 100 milliGRAM(s) Oral every 8 hours  docusate sodium 100 milliGRAM(s) Oral every 12 hours  senna 2 Tablet(s) Oral at bedtime  levothyroxine 125 MICROGram(s) Oral every other day  enoxaparin Injectable 40 milliGRAM(s) SubCutaneous daily  aspirin  chewable 81 milliGRAM(s) Oral daily  ALBUTerol/ipratropium for Nebulization 3 milliLiter(s) Nebulizer every 6 hours  cefuroxime   Tablet 250 milliGRAM(s) Oral every 12 hours    MEDICATIONS  (PRN):  acetaminophen   Tablet 650 milliGRAM(s) Oral every 6 hours PRN For Temp over 38.3 C (100.94 F)  acetaminophen   Tablet 650 milliGRAM(s) Oral every 6 hours PRN headache  oxyCODONE IR 5 milliGRAM(s) Oral every 4 hours PRN Mild Pain  oxyCODONE IR 10 milliGRAM(s) Oral every 4 hours PRN Moderate Pain  aluminum hydroxide/magnesium hydroxide/simethicone Suspension 30 milliLiter(s) Oral four times a day PRN Indigestion  ondansetron Injectable 4 milliGRAM(s) IV Push every 6 hours PRN Nausea and/or Vomiting  bisacodyl Suppository 10 milliGRAM(s) Rectal daily PRN If no bowel movement by POD#2  benzocaine 15 mG/menthol 3.6 mG Lozenge 1 Lozenge Oral every 4 hours PRN Sore Throat  diphenhydrAMINE   Capsule 25 milliGRAM(s) Oral at bedtime PRN Insomnia  ALPRAZolam 0.5 milliGRAM(s) Oral four times a day PRN anxiety  HYDROmorphone  Injectable 1 milliGRAM(s) IV Push every 4 hours PRN Severe Pain (7 - 10)

## 2017-07-05 NOTE — PROGRESS NOTE ADULT - SUBJECTIVE AND OBJECTIVE BOX
Patient seen and examined at bedside. Pain controlled. Patient had a fever of 100.3 at night.     Vital Signs Last 24 Hrs  T(C): 37.9 (05 Jul 2017 03:13), Max: 38 (04 Jul 2017 21:50)  T(F): 100.3 (05 Jul 2017 03:13), Max: 100.4 (04 Jul 2017 21:50)  HR: 77 (05 Jul 2017 05:46) (74 - 77)  BP: 118/54 (05 Jul 2017 05:46) (111/55 - 118/54)  BP(mean): --  RR: 16 (04 Jul 2017 15:52) (16 - 16)  SpO2: 100% (04 Jul 2017 15:52) (100% - 100%)    Physical exam:  Gen: NAD  Right Lower Extremity:  Dsg c/d/i  SILT L2-S1  +EHL/FHL/TA/Gastroc  DP+  Soft compartments, - calf ttp    Assessment and Plan:     A/P: 76F s/p R FABIANO POD 5    ID/medicine eval appreciated  continue abx per ID  Analgesia   DVT ppx  WBAT - posterior hip precautions  PT/OT  D/C planning - ortho stable for discharge

## 2017-07-07 LAB
CULTURE RESULTS: SIGNIFICANT CHANGE UP
CULTURE RESULTS: SIGNIFICANT CHANGE UP
SPECIMEN SOURCE: SIGNIFICANT CHANGE UP
SPECIMEN SOURCE: SIGNIFICANT CHANGE UP

## 2017-07-10 DIAGNOSIS — I95.81 POSTPROCEDURAL HYPOTENSION: ICD-10-CM

## 2017-07-10 DIAGNOSIS — F17.210 NICOTINE DEPENDENCE, CIGARETTES, UNCOMPLICATED: ICD-10-CM

## 2017-07-10 DIAGNOSIS — E03.9 HYPOTHYROIDISM, UNSPECIFIED: ICD-10-CM

## 2017-07-10 DIAGNOSIS — J40 BRONCHITIS, NOT SPECIFIED AS ACUTE OR CHRONIC: ICD-10-CM

## 2017-07-10 DIAGNOSIS — M16.11 UNILATERAL PRIMARY OSTEOARTHRITIS, RIGHT HIP: ICD-10-CM

## 2017-07-10 DIAGNOSIS — I25.10 ATHEROSCLEROTIC HEART DISEASE OF NATIVE CORONARY ARTERY WITHOUT ANGINA PECTORIS: ICD-10-CM

## 2017-07-10 DIAGNOSIS — F32.9 MAJOR DEPRESSIVE DISORDER, SINGLE EPISODE, UNSPECIFIED: ICD-10-CM

## 2017-07-10 DIAGNOSIS — Z86.79 PERSONAL HISTORY OF OTHER DISEASES OF THE CIRCULATORY SYSTEM: ICD-10-CM

## 2017-07-10 DIAGNOSIS — Z87.19 PERSONAL HISTORY OF OTHER DISEASES OF THE DIGESTIVE SYSTEM: ICD-10-CM

## 2017-07-10 DIAGNOSIS — L76.32 POSTPROCEDURAL HEMATOMA OF SKIN AND SUBCUTANEOUS TISSUE FOLLOWING OTHER PROCEDURE: ICD-10-CM

## 2017-07-10 DIAGNOSIS — Z96.89 PRESENCE OF OTHER SPECIFIED FUNCTIONAL IMPLANTS: ICD-10-CM

## 2017-07-10 DIAGNOSIS — D72.829 ELEVATED WHITE BLOOD CELL COUNT, UNSPECIFIED: ICD-10-CM

## 2017-07-10 DIAGNOSIS — J98.11 ATELECTASIS: ICD-10-CM

## 2017-07-10 DIAGNOSIS — Z88.8 ALLERGY STATUS TO OTHER DRUGS, MEDICAMENTS AND BIOLOGICAL SUBSTANCES STATUS: ICD-10-CM

## 2017-07-10 DIAGNOSIS — Z88.1 ALLERGY STATUS TO OTHER ANTIBIOTIC AGENTS STATUS: ICD-10-CM

## 2017-07-10 DIAGNOSIS — D50.9 IRON DEFICIENCY ANEMIA, UNSPECIFIED: ICD-10-CM

## 2017-07-10 DIAGNOSIS — I10 ESSENTIAL (PRIMARY) HYPERTENSION: ICD-10-CM

## 2017-07-10 DIAGNOSIS — F41.9 ANXIETY DISORDER, UNSPECIFIED: ICD-10-CM

## 2017-07-10 DIAGNOSIS — Z88.2 ALLERGY STATUS TO SULFONAMIDES: ICD-10-CM

## 2017-07-10 DIAGNOSIS — D62 ACUTE POSTHEMORRHAGIC ANEMIA: ICD-10-CM

## 2017-07-10 DIAGNOSIS — Z98.890 OTHER SPECIFIED POSTPROCEDURAL STATES: ICD-10-CM

## 2017-07-10 DIAGNOSIS — M54.9 DORSALGIA, UNSPECIFIED: ICD-10-CM

## 2017-07-10 DIAGNOSIS — J44.9 CHRONIC OBSTRUCTIVE PULMONARY DISEASE, UNSPECIFIED: ICD-10-CM

## 2017-07-10 DIAGNOSIS — R50.82 POSTPROCEDURAL FEVER: ICD-10-CM

## 2017-09-15 ENCOUNTER — APPOINTMENT (OUTPATIENT)
Dept: VASCULAR SURGERY | Facility: CLINIC | Age: 77
End: 2017-09-15
Payer: MEDICARE

## 2017-09-15 VITALS
TEMPERATURE: 98 F | HEIGHT: 65 IN | BODY MASS INDEX: 22.49 KG/M2 | WEIGHT: 135 LBS | DIASTOLIC BLOOD PRESSURE: 84 MMHG | RESPIRATION RATE: 16 BRPM | SYSTOLIC BLOOD PRESSURE: 135 MMHG | OXYGEN SATURATION: 95 % | HEART RATE: 72 BPM

## 2017-09-15 DIAGNOSIS — I65.29 OCCLUSION AND STENOSIS OF UNSPECIFIED CAROTID ARTERY: ICD-10-CM

## 2017-09-15 PROCEDURE — 99214 OFFICE O/P EST MOD 30 MIN: CPT

## 2017-09-18 RX ORDER — CEFDINIR 300 MG/1
300 CAPSULE ORAL
Qty: 20 | Refills: 0 | Status: DISCONTINUED | COMMUNITY
Start: 2016-12-16 | End: 2017-09-18

## 2018-04-27 ENCOUNTER — TRANSCRIPTION ENCOUNTER (OUTPATIENT)
Age: 78
End: 2018-04-27

## 2018-07-16 PROBLEM — M75.00 ADHESIVE CAPSULITIS OF UNSPECIFIED SHOULDER: Chronic | Status: ACTIVE | Noted: 2017-06-27

## 2018-07-16 PROBLEM — Z87.19 PERSONAL HISTORY OF OTHER DISEASES OF THE DIGESTIVE SYSTEM: Chronic | Status: ACTIVE | Noted: 2017-06-27

## 2019-03-23 PROBLEM — M81.0 AGE-RELATED OSTEOPOROSIS WITHOUT CURRENT PATHOLOGICAL FRACTURE: Chronic | Status: ACTIVE | Noted: 2017-06-27

## 2019-03-23 PROBLEM — I25.10 ATHEROSCLEROTIC HEART DISEASE OF NATIVE CORONARY ARTERY WITHOUT ANGINA PECTORIS: Chronic | Status: ACTIVE | Noted: 2017-06-27

## 2019-03-23 PROBLEM — M16.9 OSTEOARTHRITIS OF HIP, UNSPECIFIED: Chronic | Status: ACTIVE | Noted: 2017-06-27

## 2019-03-23 PROBLEM — G62.9 POLYNEUROPATHY, UNSPECIFIED: Chronic | Status: ACTIVE | Noted: 2017-06-27

## 2019-03-23 PROBLEM — M54.31 SCIATICA, RIGHT SIDE: Chronic | Status: ACTIVE | Noted: 2017-06-27

## 2019-03-23 PROBLEM — M51.9 UNSPECIFIED THORACIC, THORACOLUMBAR AND LUMBOSACRAL INTERVERTEBRAL DISC DISORDER: Chronic | Status: ACTIVE | Noted: 2017-06-27

## 2019-03-23 PROBLEM — D64.9 ANEMIA, UNSPECIFIED: Chronic | Status: ACTIVE | Noted: 2017-06-27

## 2019-03-23 PROBLEM — R26.89 OTHER ABNORMALITIES OF GAIT AND MOBILITY: Chronic | Status: ACTIVE | Noted: 2017-06-27

## 2019-03-23 PROBLEM — J44.9 CHRONIC OBSTRUCTIVE PULMONARY DISEASE, UNSPECIFIED: Chronic | Status: ACTIVE | Noted: 2017-06-27

## 2019-03-23 PROBLEM — K80.20 CALCULUS OF GALLBLADDER WITHOUT CHOLECYSTITIS WITHOUT OBSTRUCTION: Chronic | Status: ACTIVE | Noted: 2017-06-27

## 2019-03-28 ENCOUNTER — APPOINTMENT (OUTPATIENT)
Dept: NEUROSURGERY | Facility: CLINIC | Age: 79
End: 2019-03-28
Payer: MEDICARE

## 2019-03-28 VITALS
WEIGHT: 138.25 LBS | TEMPERATURE: 98.6 F | HEIGHT: 61.77 IN | DIASTOLIC BLOOD PRESSURE: 66 MMHG | HEART RATE: 95 BPM | SYSTOLIC BLOOD PRESSURE: 151 MMHG | RESPIRATION RATE: 16 BRPM | BODY MASS INDEX: 25.44 KG/M2

## 2019-03-28 DIAGNOSIS — M54.5 LOW BACK PAIN: ICD-10-CM

## 2019-03-28 DIAGNOSIS — Z98.1 ARTHRODESIS STATUS: ICD-10-CM

## 2019-03-28 PROCEDURE — 99204 OFFICE O/P NEW MOD 45 MIN: CPT

## 2019-04-04 NOTE — CONSULT LETTER
[Dear  ___] : Dear  [unfilled], [Sincerely,] : Sincerely, [DrJose  ___] : Dr. TORRES [Consult Letter:] : I had the pleasure of evaluating your patient, [unfilled]. [Consult Closing:] : Thank you very much for allowing me to participate in the care of this patient.  If you have any questions, please do not hesitate to contact me. [FreeTextEntry2] : David Strauss MD\par [address]\par [place] [address] [FreeTextEntry1] : Selena is a pleasant 78-year-old female patient who was seen in our office today in regards to low back pain and bilateral leg symptoms.\par \par The patient endorses a new onset of low back pain starting around January 28, 2019. She does not recall any inciting event or trauma. The back pain has been worsening and is now constant. The patient's back pain itself remains fairly mild rated at 3/10 in severity and localized on the left; but is also accompanied with bilateral leg pain rated as a 6/10 in severity. This is also accompanied with knee pain and tingling in her feet bilaterally. The patient also recollects a fall approximately 2 weeks ago, though her back pain started well before then. The patient notes that standing reduces her pain whereas bending over or moving her lower back in general causes increased pain. Her bilateral leg pain worsens with activity. The patient is being seen by a pain management physician who had prescribed tramadol and Lyrica. These medications are adequately treating her pain at the moment.\par \par The patient has not history includes hypertension, COPD, thyroid dysfunction, osteoporosis, fibromyalgia, depression, and anxiety. The patient's past surgical history includes a decompression and fusion of the lumbar spine performed on July 30, 2018. Patient is unclear as to the exact nature of the procedure performed.\par \par I have no new imaging to review today. The patient has not had any imaging of her lumbar spine since her most recent lumbar decompression and fusion. The patient does have a CT scan dated June 6, 2018 which demonstrates an L4/5 spondylolisthesis with central stenosis. The patient also has a cervical spine MRI scan dated September 21, 2018 which demonstrates multilevel degenerative changes without cord compression. However multilevel foraminal stenosis is seen worse at C6/7. The patient also has a thoracic MRI scan which demonstrates chronic compression fractures of T9 and T11. There is also evidence of marrow edema in the inferior endplate of T10 suggesting any acute/subacute fracture.\par \par Taken together, the patient's clinical and radiographic findings support low back pain secondary to a healing endplate fracture of T10. However, given that the patient is clinically well with regards to her axial pain, I do not believe any further treatment for this fracture is warranted. The patient's bilateral leg symptoms however, could suggest worsening adjacent segment disease or dynamic instability. This is of particular concern given the patient's history of osteoporosis and a spinal fusion. At this time, I recommended a flexion/extension x-ray of the lumbar spine to rule out any dynamic instability above her previous fusion as well as a MRI scan of her lumbar spine to rule out adjacent segment disease. I look forward to seeing the patient back in our office once these exams are complete to review them with her. [FreeTextEntry3] : \par Schuyler Leonard MD, PhD, FRCSC, FAANS\par \par Attending Neurosurgeon\par [facility] [place]\par  of Neurosurgery\par [facility]\par \par [address]\[place] [address]\par \par Office: [phone]\par Fax: [phone]\par Email: [email or web address]

## 2019-05-02 ENCOUNTER — APPOINTMENT (OUTPATIENT)
Dept: NEUROSURGERY | Facility: CLINIC | Age: 79
End: 2019-05-02
Payer: MEDICARE

## 2019-05-02 VITALS
SYSTOLIC BLOOD PRESSURE: 166 MMHG | HEART RATE: 69 BPM | HEIGHT: 61.77 IN | WEIGHT: 138 LBS | RESPIRATION RATE: 14 BRPM | BODY MASS INDEX: 25.4 KG/M2 | TEMPERATURE: 99.1 F | DIASTOLIC BLOOD PRESSURE: 76 MMHG

## 2019-05-02 DIAGNOSIS — M48.062 SPINAL STENOSIS, LUMBAR REGION WITH NEUROGENIC CLAUDICATION: ICD-10-CM

## 2019-05-02 DIAGNOSIS — M54.10 RADICULOPATHY, SITE UNSPECIFIED: ICD-10-CM

## 2019-05-02 PROCEDURE — 99214 OFFICE O/P EST MOD 30 MIN: CPT

## 2019-05-02 RX ORDER — PREDNISONE 20 MG/1
20 TABLET ORAL
Refills: 0 | Status: ACTIVE | COMMUNITY

## 2019-05-02 RX ORDER — FLUTICASONE FUROATE, UMECLIDINIUM BROMIDE AND VILANTEROL TRIFENATATE 100; 62.5; 25 UG/1; UG/1; UG/1
100-62.5-25 POWDER RESPIRATORY (INHALATION)
Refills: 0 | Status: ACTIVE | COMMUNITY

## 2019-05-09 PROBLEM — M48.062 LUMBAR STENOSIS WITH NEUROGENIC CLAUDICATION: Status: ACTIVE | Noted: 2019-05-09

## 2019-05-09 NOTE — REASON FOR VISIT
[Follow-Up: _____] : a [unfilled] follow-up visit [FreeTextEntry1] : Pt is here to review XRAY and MRI at [facility]

## 2019-05-09 NOTE — CONSULT LETTER
[Dear  ___] : Dear  [unfilled], [Sincerely,] : Sincerely, [Consult Letter:] : I had the pleasure of evaluating your patient, [unfilled]. [FreeTextEntry2] : David Strauss MD\par [address]\par [place] [address]  [Consult Closing:] : Thank you very much for allowing me to participate in the care of this patient.  If you have any questions, please do not hesitate to contact me. [FreeTextEntry3] : \par Schuyler Leonard MD, PhD, FRCSC, FAANS\par \par Attending Neurosurgeon\par [facility] [place]\par  of Neurosurgery\par [facility]\par \par [address]\[place] [address]\par \par Office: [phone]\par Fax: [phone]\par Email: [email or web address]\par   [FreeTextEntry1] : Selena is a very pleasant 78-year-old female patient who was seen in our office today in followup. The patient had previously been complaining of low back pain with bilateral leg symptoms and returned today to review her images.\par \par Briefly, the patient endorses a new onset of low back pain starting around January 28, 2019. The back pain has been worsening and is constant, and most recently began including bilateral leg pain rated at 6/10 in severity. The patient also had a recent fall, but the back pain and leg pain preceded this event. The patient notes that bending forward helps with her pain, and activity worsens her symptoms. The patient also notices mild worsening of the numbness in her feet. The patient has previously had an interspinous device placed at L4/5.\par \par On examination, the patient is alert, oriented, and compliant with the exam. The patient demonstrates 5/5 strength in her lower extremities except with hip flexion which is rated at a 4/5 bilaterally. The patient's reflexes in the lower extremities are rated at 2+ at the patella and 1+ at the Achilles tendons bilaterally. The patient walks with a stooped posture and a slightly antalgic gait.\par \par The patient is accompanied with an MRI scan dated April 2, 2019. On the scan, there is evidence of the previous L4/5 decompression and placement of an interspinous device. This level appears decompressed compared to her preoperative scan. Unfortunately, at L3/4 her stenosis has worsened, and at L2/3, a previously known disc herniation has also increased in size.\par \par Taken together, the patient has a clinical history and radiographic evidence supportive of neurogenic claudication secondary to lumbar stenosis and a disc herniation at L3/4 and L2/3 respectively. I've explained to the patient the significance of her MRI findings and that, should conservative management fail, she would be a candidate for surgical intervention. At this time, the patient would like to begin physical therapy for core strengthening exercises as well as lower extremity strengthening exercises. She is already following up with an excellent pain management physician, and the patient has been taking tramadol and Lyrica regularly. At this time, I have left the option of followup to the patient and informed her that she is welcome to contact our office at any time.

## 2019-08-23 ENCOUNTER — TRANSCRIPTION ENCOUNTER (OUTPATIENT)
Age: 79
End: 2019-08-23

## 2019-12-26 ENCOUNTER — TRANSCRIPTION ENCOUNTER (OUTPATIENT)
Age: 79
End: 2019-12-26

## 2020-01-24 ENCOUNTER — APPOINTMENT (OUTPATIENT)
Dept: VASCULAR SURGERY | Facility: CLINIC | Age: 80
End: 2020-01-24
Payer: MEDICARE

## 2020-01-24 VITALS
OXYGEN SATURATION: 95 % | WEIGHT: 138 LBS | SYSTOLIC BLOOD PRESSURE: 202 MMHG | HEIGHT: 65 IN | TEMPERATURE: 98.4 F | HEART RATE: 79 BPM | BODY MASS INDEX: 22.99 KG/M2 | DIASTOLIC BLOOD PRESSURE: 90 MMHG

## 2020-01-24 VITALS — SYSTOLIC BLOOD PRESSURE: 178 MMHG | DIASTOLIC BLOOD PRESSURE: 77 MMHG

## 2020-01-24 DIAGNOSIS — I87.8 OTHER SPECIFIED DISORDERS OF VEINS: ICD-10-CM

## 2020-01-24 PROCEDURE — 99213 OFFICE O/P EST LOW 20 MIN: CPT

## 2020-01-24 PROCEDURE — 93970 EXTREMITY STUDY: CPT

## 2020-01-31 ENCOUNTER — APPOINTMENT (OUTPATIENT)
Dept: VASCULAR SURGERY | Facility: CLINIC | Age: 80
End: 2020-01-31
Payer: MEDICARE

## 2020-01-31 DIAGNOSIS — I89.0 LYMPHEDEMA, NOT ELSEWHERE CLASSIFIED: ICD-10-CM

## 2020-01-31 PROBLEM — I87.8 STASIS, VENOUS: Status: ACTIVE | Noted: 2020-01-31

## 2020-01-31 PROCEDURE — 29580 STRAPPING UNNA BOOT: CPT

## 2020-01-31 NOTE — PHYSICAL EXAM
[JVD] : no jugular venous distention  [Carotid Bruits] : no carotid bruits [Normal Breath Sounds] : Normal breath sounds [Normal Heart Sounds] : normal heart sounds [2+] : left 2+ [Ankle Swelling (On Exam)] : present [Varicose Veins Of Lower Extremities] : bilaterally [Ankle Swelling On The Left] : moderate [] : bilaterally [Ankle Swelling Bilaterally] : severe [No Rash or Lesion] : No rash or lesion [Alert] : alert [Oriented to Person] : oriented to person [Oriented to Place] : oriented to place [Oriented to Time] : oriented to time [Calm] : calm [de-identified] : awake alert [de-identified] : MOM, PERRLA [de-identified] : edema, no cyanosis

## 2020-01-31 NOTE — PROCEDURE
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

## 2020-01-31 NOTE — ASSESSMENT
[FreeTextEntry1] : 80 y/o woman with venous insufficiency and lymphedema. Reassured her she has no blood clots but will need aggressive compression to resolve the swelling. Patient has a component of primary lymphedema. She has been compliant with conservative therapies including compression and elevation for the past month. Despite these therapies symptoms continue to progress. Early signs of hyperpigmentation noted in both calves. I am now recommending a lymphedema pump vended by [employer]. We will place UB dressings to start aggressive compression and monitor weekly.\par \par -Continue compression\par -Lymphedema pumps\par -Treat right knee per ortho\par -UB dressing\par -RTC next week\par  [Arterial/Venous Disease] : arterial/venous disease

## 2020-01-31 NOTE — HISTORY OF PRESENT ILLNESS
[FreeTextEntry1] : 80 y/o woman with history of PAD, had CEA 3 years ago, comes for follow up for worsening leg edema. No ulceration, cellulitis, fever or chills. Has been using support stockings for a few months without improvement. Concerned about swelling, pain and discoloration. Ambulates without assistance.

## 2020-02-07 ENCOUNTER — APPOINTMENT (OUTPATIENT)
Dept: VASCULAR SURGERY | Facility: CLINIC | Age: 80
End: 2020-02-07
Payer: MEDICARE

## 2020-02-07 VITALS
HEIGHT: 65 IN | HEART RATE: 84 BPM | BODY MASS INDEX: 22.99 KG/M2 | DIASTOLIC BLOOD PRESSURE: 85 MMHG | OXYGEN SATURATION: 95 % | WEIGHT: 138 LBS | TEMPERATURE: 98.6 F | SYSTOLIC BLOOD PRESSURE: 136 MMHG

## 2020-02-07 DIAGNOSIS — Z98.890 OTHER SPECIFIED POSTPROCEDURAL STATES: ICD-10-CM

## 2020-02-07 PROCEDURE — 99213 OFFICE O/P EST LOW 20 MIN: CPT

## 2020-02-28 ENCOUNTER — APPOINTMENT (OUTPATIENT)
Dept: VASCULAR SURGERY | Facility: CLINIC | Age: 80
End: 2020-02-28

## 2020-03-28 ENCOUNTER — TRANSCRIPTION ENCOUNTER (OUTPATIENT)
Age: 80
End: 2020-03-28

## 2020-04-29 PROBLEM — Z98.890 S/P CAROTID ENDARTERECTOMY: Status: ACTIVE | Noted: 2017-09-18

## 2020-04-29 NOTE — ASSESSMENT
[Arterial/Venous Disease] : arterial/venous disease [FreeTextEntry1] : 80 y/o woman with venous insufficiency and lymphedema. Reassured her she has no blood clots but will need aggressive compression to resolve the swelling. Patient has a component of primary lymphedema. She has been compliant with conservative therapies including compression and elevation for the past month. Despite these therapies symptoms continue to progress. Early signs of hyperpigmentation noted in both calves. I am now recommending a lymphedema pump vended by [employer]. Improved significantly after 2 UB dressings. Will continue with pumps and stockings.\par \par -Continue compression\par -Lymphedema pumps\par -Treat right knee per ortho\par -RTC as needed\par

## 2020-04-29 NOTE — PHYSICAL EXAM
[JVD] : no jugular venous distention  [Normal Breath Sounds] : Normal breath sounds [Carotid Bruits] : no carotid bruits [Normal Heart Sounds] : normal heart sounds [2+] : right 2+ [Ankle Swelling (On Exam)] : present [Varicose Veins Of Lower Extremities] : present [Ankle Swelling On The Left] : moderate [Ankle Swelling Bilaterally] : severe [] : bilaterally [Oriented to Person] : oriented to person [Alert] : alert [No Rash or Lesion] : No rash or lesion [Calm] : calm [Oriented to Time] : oriented to time [Oriented to Place] : oriented to place [de-identified] : MOM, PERRLA [de-identified] : awake alert [de-identified] : edema, no cyanosis

## 2020-05-13 PROBLEM — I89.0 LYMPHEDEMA OF BOTH LOWER EXTREMITIES: Status: ACTIVE | Noted: 2020-01-31

## 2020-05-13 NOTE — PHYSICAL EXAM
[JVD] : no jugular venous distention  [Carotid Bruits] : no carotid bruits [Normal Breath Sounds] : Normal breath sounds [Normal Heart Sounds] : normal heart sounds [2+] : left 2+ [Ankle Swelling (On Exam)] : present [Varicose Veins Of Lower Extremities] : present [Ankle Swelling On The Left] : moderate [] : bilaterally [Ankle Swelling Bilaterally] : severe [No Rash or Lesion] : No rash or lesion [Alert] : alert [Oriented to Person] : oriented to person [Oriented to Place] : oriented to place [Oriented to Time] : oriented to time [Calm] : calm [de-identified] : awake alert [de-identified] : MOM, PERRLA [de-identified] : edema, no cyanosis

## 2020-05-13 NOTE — ASSESSMENT
[FreeTextEntry1] : 78 y/o woman with venous insufficiency and lymphedema. Reassured her she has no blood clots but will need aggressive compression to resolve the swelling. Patient has a component of primary lymphedema. She has been compliant with conservative therapies including compression and elevation for the past month. Despite these therapies symptoms continue to progress. Early signs of hyperpigmentation noted in both calves. I am now recommending a lymphedema pump vended by [employer]. We will place UB dressings to start aggressive compression and monitor weekly. Improved with UB will repeat this week.\par \par -Continue compression\par -Lymphedema pumps\par -Treat right knee per ortho\par -UB dressing\par -RTC next week\par  [Arterial/Venous Disease] : arterial/venous disease

## 2020-10-01 PROBLEM — M54.10 BILATERAL RADIATING LEG PAIN: Status: RESOLVED | Noted: 2019-04-04 | Resolved: 2020-10-01

## 2021-08-24 NOTE — H&P PST ADULT - PAIN, CHRONIC: FACTORS THAT AGGRAVATE, PROFILE
Caller: Marcela Gutierrez    Relationship: Self    Best call back number: [phone]    Medication needed:   Requested Prescriptions      No prescriptions requested or ordered in this encounter   topiramate (TOPAMAX) 100 MG tablet  metoprolol tartrate (LOPRESSOR) 50 MG tablet    When do you need the refill by: WHEN POSSIBLE    What additional details did the patient provide when requesting the medication: PATIENT HAS A WEEKS WORTH LEFT    Does the patient have less than a 3 day supply:  [] Yes  [x] No    What is the patient's preferred pharmacy: [facility] - [facility] [address] [facility] - [phone]  - [phone]    
topamax & metoprolol   LF:5/4/21 #90  LV:7/14/21  
activity

## 2022-06-30 NOTE — PATIENT PROFILE ADULT. - NSSUBSTANCEUSE_GEN_ALL_CORE_SD
Left voicemail message for patient to call [phone] to reschedule canceled appointment from 6/29/22.  
caffeine/Denies street drugs.

## 2025-05-06 NOTE — PROGRESS NOTE ADULT - EYES
We are committed to providing you the best care possible.    If you receive a survey after visiting one of our offices, please take time to share your experience concerning your physician office visit.  These surveys are confidential and no health information about you is shared.    We are eager to improve for you and continue to give you satisfactory care, we are counting on your feedback to help make that happen.            detailed exam conjunctiva clear/EOMI